# Patient Record
Sex: FEMALE | Race: WHITE | Employment: PART TIME | URBAN - METROPOLITAN AREA
[De-identification: names, ages, dates, MRNs, and addresses within clinical notes are randomized per-mention and may not be internally consistent; named-entity substitution may affect disease eponyms.]

---

## 2017-01-27 ENCOUNTER — OFFICE VISIT (OUTPATIENT)
Dept: INTERNAL MEDICINE CLINIC | Age: 57
End: 2017-01-27

## 2017-01-27 VITALS
DIASTOLIC BLOOD PRESSURE: 78 MMHG | OXYGEN SATURATION: 90 % | HEART RATE: 72 BPM | BODY MASS INDEX: 34.91 KG/M2 | WEIGHT: 197 LBS | HEIGHT: 63 IN | RESPIRATION RATE: 16 BRPM | SYSTOLIC BLOOD PRESSURE: 122 MMHG | TEMPERATURE: 97.8 F

## 2017-01-27 DIAGNOSIS — R73.03 PRE-DIABETES: ICD-10-CM

## 2017-01-27 DIAGNOSIS — E78.2 MIXED HYPERLIPIDEMIA: ICD-10-CM

## 2017-01-27 DIAGNOSIS — E55.9 VITAMIN D DEFICIENCY: ICD-10-CM

## 2017-01-27 DIAGNOSIS — N95.9 POSTMENOPAUSAL SYMPTOMS: ICD-10-CM

## 2017-01-27 DIAGNOSIS — R10.12 LEFT UPPER QUADRANT PAIN: Primary | ICD-10-CM

## 2017-01-27 RX ORDER — OMEPRAZOLE 10 MG/1
10 CAPSULE, DELAYED RELEASE ORAL DAILY
COMMUNITY
End: 2019-08-28 | Stop reason: ALTCHOICE

## 2017-01-27 RX ORDER — DIAZEPAM 10 MG/1
TABLET ORAL
Refills: 1 | COMMUNITY
Start: 2017-01-02 | End: 2017-03-15 | Stop reason: ALTCHOICE

## 2017-01-27 RX ORDER — VENLAFAXINE 37.5 MG/1
37.5 TABLET ORAL 2 TIMES DAILY
Qty: 60 TAB | Refills: 0 | Status: SHIPPED | OUTPATIENT
Start: 2017-01-27 | End: 2017-02-22 | Stop reason: SDUPTHER

## 2017-01-27 NOTE — MR AVS SNAPSHOT
Visit Information Date & Time Provider Department Dept. Phone Encounter #  
 1/27/2017  8:45 AM Elizabeth Lucas MD [facility] Internal Medicine [phone] [id number] Upcoming Health Maintenance Date Due Hepatitis C Screening 1960 DTaP/Tdap/Td series (1 - Tdap) 12/16/1982 PAP AKA CERVICAL CYTOLOGY 1/28/2016 INFLUENZA AGE 9 TO ADULT 8/1/2016 BREAST CANCER SCRN MAMMOGRAM 4/4/2018 COLONOSCOPY 7/28/2022 Allergies as of 1/27/2017  Review Complete On: 1/27/2017 By: Elizabeth Lucas MD  
  
 Severity Noted Reaction Type Reactions Tylenol-codeine #3 [Acetaminophen-codeine] High 07/05/2013   Side Effect Other (comments) Vomitting, and upset stomach state that she is not allergic Iodinated Contrast Media - Oral And Iv Dye  09/04/2015   Systemic Hives Current Immunizations  Never Reviewed Name Date Influenza Vaccine Intradermal PF 11/13/2015 Not reviewed this visit You Were Diagnosed With   
  
 Codes Comments Left upper quadrant pain    -  Primary ICD-10-CM: R10.12 ICD-9-CM: 789.02 Postmenopausal symptoms     ICD-10-CM: N95.9 ICD-9-CM: 627.9 Pre-diabetes     ICD-10-CM: R73.03 
ICD-9-CM: 790.29 Mixed hyperlipidemia     ICD-10-CM: E78.2 ICD-9-CM: 272.2 Vitamin D deficiency     ICD-10-CM: E55.9 ICD-9-CM: 268.9 Vitals BP Pulse Temp Resp Height(growth percentile) Weight(growth percentile) 122/78 (BP 1 Location: Right arm, BP Patient Position: Sitting) 72 97.8 °F (36.6 °C) (Oral) 16 5' 3\" (1.6 m) 197 lb (89.4 kg) SpO2 BMI OB Status Smoking Status 90% 34.9 kg/m2 Ablation Never Smoker Vitals History BMI and BSA Data Body Mass Index Body Surface Area 34.9 kg/m 2 1.99 m 2 Preferred Pharmacy Pharmacy Name Phone [id number] [phone] Your Updated Medication List  
  
   
 This list is accurate as of: 1/27/17 10:16 AM.  Always use your most recent med list.  
  
  
  
  
 diazePAM 10 mg tablet Commonly known as:  VALIUM  
TAKE 1 TABLET BY MOUTH AT BEDTIME  
  
 omeprazole 10 mg capsule Commonly known as:  PRILOSEC Take 10 mg by mouth daily. PERCOCET 5-325 mg per tablet Generic drug:  oxyCODONE-acetaminophen Take 1 Tab by mouth every four (4) hours as needed for Pain. venlafaxine 37.5 mg tablet Commonly known as:  [facility] Take 1 Tab by mouth two (2) times a day for 30 days. VITAMIN D3 1,000 unit Cap Generic drug:  cholecalciferol Take  by mouth. Prescriptions Sent to Pharmacy Refills  
 venlafaxine (EFFEXOR) 37.5 mg tablet 0 Sig: Take 1 Tab by mouth two (2) times a day for 30 days. Class: Normal  
 Pharmacy: [facility] #: [phone] Route: Oral  
  
We Performed the Following HEMOGLOBIN A1C WITH EAG [87388 CPT(R)] LIPID PANEL [05324 CPT(R)] METABOLIC PANEL, COMPREHENSIVE [87358 CPT(R)] REFERRAL TO GASTROENTEROLOGY [XUL54 Custom] Comments:  
 Needs endoscopy , gastric tenderness VITAMIN D, 25 HYDROXY [id number] CPT(R)] To-Do List   
 01/27/2017 Lab:  VITAMIN D, 25 HYDROXY Referral Information Referral ID Referred By Referred To  
  
 [id number] Polina Sake Not Available Visits Status Start Date End Date 1 New Request 1/27/17 1/27/18 If your referral has a status of pending review or denied, additional information will be sent to support the outcome of this decision. Introducing [facility]! Javier Sher introduces [employer] patient portal. Now you can access parts of your medical record, email your doctor's office, and request medication refills online. 1. In your internet browser, go to [email or web address] 2. Click on the First Time User? Click Here link in the Sign In box. You will see the New Member Sign Up page. 3. Enter your Of[employer]es Access Code exactly as it appears below. You will not need to use this code after youve completed the sign-up process. If you do not sign up before the expiration date, you must request a new code. · Of[employer]es Access Code: [email or web address] Expires: 4/27/2017  9:41 AM 
 
4. Enter the last four digits of your Social Security Number (xxxx) and Date of Birth (mm/dd/yyyy) as indicated and click Submit. You will be taken to the next sign-up page. 5. Create a Offees ID. This will be your Offees login ID and cannot be changed, so think of one that is secure and easy to remember. 6. Create a Of[employer]es password. You can change your password at any time. 7. Enter your Password Reset Question and Answer. This can be used at a later time if you forget your password. 8. Enter your e-mail address. You will receive e-mail notification when new information is available in [address] 9. Click Sign Up. You can now view and download portions of your medical record. 10. Click the Download Summary menu link to download a portable copy of your medical information. If you have questions, please visit the Frequently Asked Questions section of the Of[employer]es website. Remember, Of[employer]es is NOT to be used for urgent needs. For medical emergencies, dial 911. Now available from your iPhone and Android! Please provide this summary of care documentation to your next provider. Your primary care clinician is listed as Marv Hyatt. If you have any questions after today's visit, please call [phone].

## 2017-01-27 NOTE — PROGRESS NOTES
Written by Ramandeep Mejia, as dictated by Dr. Nakita Asher MD.    Rebel Ho is a 55 y.o. female. HPI  The patient comes in for an ED follow up. About 2 weeks ago she had a dull ache in her LUQ. She started getting so nauseated and she could not eat any food. She had no diarrhea or vomiting. She had a fever of 101.4 and chills. She had a CT scan done at the ED that showed colitis. She did not have a flu test. She is no longer having fever as of yesterday. She has been using omeprazole for 6 days, which has helped pain  some. She did take some tylenol for her fever and 1 advil. Advil typically upsets her stomach. She is only taking a half percocet once a day now. She takes Valium as needed for muscle spasms. Now that she is only going to work once a week, her back pain has improved. She is having really bad hot flashes with her menopause. She has tried gels and patches. She did try hormonal pills, which helped, but she does not want to take them. She is having mood swings as well. She was on Effexor in her 25s for anxiety but does not recall any side effects from the medication. She has not been taking her vitamin D supplements. She has gained 4 lbs in the last year. Her A1c and cholesterol were both borderline last year. Her colonoscopy in 02/23. She did not get a flu shot this year. Patient Active Problem List   Diagnosis Code    Hyperglycemia R73.9    Herniated disc [id number]        Current Outpatient Prescriptions on File Prior to Visit   Medication Sig Dispense Refill    oxyCODONE-acetaminophen (PERCOCET) 5-325 mg per tablet Take 1 Tab by mouth every four (4) hours as needed for Pain.  Cholecalciferol, Vitamin D3, (VITAMIN D3) 1,000 unit cap Take  by mouth. No current facility-administered medications on file prior to visit.         Allergies   Allergen Reactions    Tylenol-Codeine #3 [Acetaminophen-Codeine] Other (comments) Vomitting, and upset stomach state that she is not allergic    Iodinated Contrast Media - Oral And Iv Dye Hives       Past Medical History   Diagnosis Date    Asthma     GERD (gastroesophageal reflux disease)     Nutcracker esophagus 2011       Past Surgical History   Procedure Laterality Date    Pr abdomen surgery proc unlisted      Hx heent      Endoscopy, colon, diagnostic      Hx gyn      Hx lumbar diskectomy  9/5/2013       Family History   Problem Relation Age of Onset    Cancer Mother     Heart Disease Father        Social History     Social History    Marital status:      Spouse name: N/A    Number of children: N/A    Years of education: N/A     Occupational History    Not on file. Social History Main Topics    Smoking status: Never Smoker    Smokeless tobacco: Never Used    Alcohol use Yes    Drug use: No    Sexual activity: Yes     Partners: Male     Other Topics Concern    Not on file     Social History Narrative           Review of Systems   Constitutional: Negative for malaise/fatigue. HENT: Negative for congestion. Respiratory: Negative for cough and wheezing. Cardiovascular: Negative for chest pain and palpitations. Gastrointestinal: Positive for abdominal pain. Negative for heartburn. Musculoskeletal: Negative for joint pain and myalgias. Neurological: Negative for weakness and headaches. Visit Vitals    /78 (BP 1 Location: Right arm, BP Patient Position: Sitting)    Pulse 72    Temp 97.8 °F (36.6 °C) (Oral)    Resp 16    Ht 5' 3\" (1.6 m)    Wt 197 lb (89.4 kg)    SpO2 90%    BMI 34.9 kg/m2     Physical Exam   Constitutional: She is oriented to person, place, and time. She appears well-nourished. No distress. HENT:   Right Ear: External ear normal.   Left Ear: External ear normal.   Mouth/Throat: Oropharynx is clear and moist.   Eyes: Conjunctivae and EOM are normal. Right eye exhibits no discharge. Left eye exhibits no discharge. Neck: Normal range of motion. Neck supple. Cardiovascular: Normal rate and regular rhythm. Pulmonary/Chest: Effort normal and breath sounds normal. She has no wheezes. Abdominal: Soft. Bowel sounds are normal. She exhibits no distension. There is tenderness. Gastric tenderness on palpation   Lymphadenopathy:     She has no cervical adenopathy. Neurological: She is alert and oriented to person, place, and time. Skin: Skin is intact. Psychiatric: She has a normal mood and affect. Nursing note and vitals reviewed. ASSESSMENT and PLAN    ICD-10-CM ICD-9-CM    1. Left upper quadrant pain R10.12 789.02 REFERRAL TO GASTROENTEROLOGY    I want her to schedule an endoscopy for the same time she is getting a colonoscopy in February. I discussed she needs to continue to take Prilosec. 2. Postmenopausal symptoms N95.9 627.9 venlafaxine (EFFEXOR) 37.5 mg tablet sent to pharmacy. I want her to take Effexor since she has previously taken it before and the cost of Selene Orts is too high. 3. Pre-diabetes R73.03 790.29 HEMOGLOBIN A1C WITH EAG      METABOLIC PANEL, COMPREHENSIVE    Her levels were borderline, so we will recheck them today. 4. Mixed hyperlipidemia E78.2 272.2 LIPID PANEL   5. Vitamin D deficiency E55.9 268.9 VITAMIN D, 25 HYDROXY    She has not been taking her vitamin D and I told her she needs to take them. This plan was reviewed with the patient and patient agrees. All questions were answered. This scribe documentation was reviewed by me and accurately reflects the examination and decisions made by me. This note will not be viewable in [address]

## 2017-01-28 LAB
25(OH)D3+25(OH)D2 SERPL-MCNC: 13.1 NG/ML (ref 30–100)
ALBUMIN SERPL-MCNC: 4.5 G/DL (ref 3.5–5.5)
ALBUMIN/GLOB SERPL: 1.5 {RATIO} (ref 1.1–2.5)
ALP SERPL-CCNC: 62 IU/L (ref 39–117)
ALT SERPL-CCNC: 36 IU/L (ref 0–32)
AST SERPL-CCNC: 20 IU/L (ref 0–40)
BILIRUB SERPL-MCNC: <0.2 MG/DL (ref 0–1.2)
BUN SERPL-MCNC: 19 MG/DL (ref 6–24)
BUN/CREAT SERPL: 25 (ref 9–23)
CALCIUM SERPL-MCNC: 9.8 MG/DL (ref 8.7–10.2)
CHLORIDE SERPL-SCNC: 99 MMOL/L (ref 96–106)
CHOLEST SERPL-MCNC: 208 MG/DL (ref 100–199)
CO2 SERPL-SCNC: 22 MMOL/L (ref 18–29)
CREAT SERPL-MCNC: 0.77 MG/DL (ref 0.57–1)
EST. AVERAGE GLUCOSE BLD GHB EST-MCNC: 126 MG/DL
GLOBULIN SER CALC-MCNC: 3.1 G/DL (ref 1.5–4.5)
GLUCOSE SERPL-MCNC: 91 MG/DL (ref 65–99)
HBA1C MFR BLD: 6 % (ref 4.8–5.6)
HDLC SERPL-MCNC: 57 MG/DL
INTERPRETATION, 910389: NORMAL
LDLC SERPL CALC-MCNC: 124 MG/DL (ref 0–99)
POTASSIUM SERPL-SCNC: 4.5 MMOL/L (ref 3.5–5.2)
PROT SERPL-MCNC: 7.6 G/DL (ref 6–8.5)
SODIUM SERPL-SCNC: 138 MMOL/L (ref 134–144)
TRIGL SERPL-MCNC: 135 MG/DL (ref 0–149)
VLDLC SERPL CALC-MCNC: 27 MG/DL (ref 5–40)

## 2017-02-22 DIAGNOSIS — N95.9 POSTMENOPAUSAL SYMPTOMS: ICD-10-CM

## 2017-02-22 RX ORDER — VENLAFAXINE 37.5 MG/1
37.5 TABLET ORAL 2 TIMES DAILY
Qty: 60 TAB | Refills: 2 | Status: SHIPPED | OUTPATIENT
Start: 2017-02-22 | End: 2017-02-27 | Stop reason: SDUPTHER

## 2017-02-27 DIAGNOSIS — N95.9 POSTMENOPAUSAL SYMPTOMS: ICD-10-CM

## 2017-02-28 RX ORDER — VENLAFAXINE 37.5 MG/1
37.5 TABLET ORAL 2 TIMES DAILY
Qty: 60 TAB | Refills: 2 | Status: SHIPPED | OUTPATIENT
Start: 2017-02-28 | End: 2017-03-01 | Stop reason: SDUPTHER

## 2017-03-01 DIAGNOSIS — N95.9 POSTMENOPAUSAL SYMPTOMS: ICD-10-CM

## 2017-03-01 RX ORDER — VENLAFAXINE 37.5 MG/1
37.5 TABLET ORAL 2 TIMES DAILY
Qty: 60 TAB | Refills: 2 | Status: SHIPPED | OUTPATIENT
Start: 2017-03-01 | End: 2017-10-09 | Stop reason: ALTCHOICE

## 2017-03-15 ENCOUNTER — OFFICE VISIT (OUTPATIENT)
Dept: INTERNAL MEDICINE CLINIC | Age: 57
End: 2017-03-15

## 2017-03-15 VITALS
RESPIRATION RATE: 16 BRPM | WEIGHT: 189.4 LBS | BODY MASS INDEX: 33.56 KG/M2 | TEMPERATURE: 98.8 F | HEART RATE: 82 BPM | DIASTOLIC BLOOD PRESSURE: 86 MMHG | OXYGEN SATURATION: 98 % | HEIGHT: 63 IN | SYSTOLIC BLOOD PRESSURE: 118 MMHG

## 2017-03-15 DIAGNOSIS — R23.2 HOT FLASHES: Primary | ICD-10-CM

## 2017-03-15 DIAGNOSIS — R07.89 CHEST TIGHTNESS: ICD-10-CM

## 2017-03-15 DIAGNOSIS — R53.83 FATIGUE, UNSPECIFIED TYPE: ICD-10-CM

## 2017-03-15 DIAGNOSIS — J45.20 ALLERGY-INDUCED ASTHMA, MILD INTERMITTENT, UNCOMPLICATED: ICD-10-CM

## 2017-03-15 RX ORDER — ALBUTEROL SULFATE 90 UG/1
1 AEROSOL, METERED RESPIRATORY (INHALATION)
Qty: 1 INHALER | Refills: 0 | Status: SHIPPED | OUTPATIENT
Start: 2017-03-15 | End: 2017-05-16 | Stop reason: SDUPTHER

## 2017-03-15 NOTE — PROGRESS NOTES
Chief Complaint   Patient presents with    Follow-up     for er visit and vitamin d.  states that there was symptoms like a heart attack, nausea, pain went to [place] [facility] on [facility] and had scan with dye, no cardio problems found. states that since starting effexor for hot flashes she is having confusion, extreme fatigue and nausea.

## 2017-03-15 NOTE — MR AVS SNAPSHOT
Visit Information Date & Time Provider Department Dept. Phone Encounter #  
 3/15/2017 11:45 AM Becky Llamas MD [facility] Internal Medicine [phone] [id number] Upcoming Health Maintenance Date Due Hepatitis C Screening 1960 DTaP/Tdap/Td series (1 - Tdap) 12/16/1982 PAP AKA CERVICAL CYTOLOGY 1/28/2016 BREAST CANCER SCRN MAMMOGRAM 4/4/2018 COLONOSCOPY 7/28/2022 Allergies as of 3/15/2017  Review Complete On: 3/15/2017 By: Becky Llamas MD  
  
 Severity Noted Reaction Type Reactions Tylenol-codeine #3 [Acetaminophen-codeine] High 07/05/2013   Side Effect Other (comments) Vomitting, and upset stomach state that she is not allergic Iodinated Contrast Media - Oral And Iv Dye  09/04/2015   Systemic Hives Current Immunizations  Never Reviewed Name Date Influenza Vaccine Intradermal PF 11/13/2015 Not reviewed this visit You Were Diagnosed With   
  
 Codes Comments Hot flashes    -  Primary ICD-10-CM: R23.2 ICD-9-CM: 782.62 Chest tightness     ICD-10-CM: R07.89 ICD-9-CM: 786.59 Fatigue, unspecified type     ICD-10-CM: R53.83 ICD-9-CM: 780.79 Allergy-induced asthma, mild intermittent, uncomplicated     YZC-65-WY: J45.20 ICD-9-CM: 493.00 Vitals BP Pulse Temp Resp Height(growth percentile) Weight(growth percentile) 118/86 (BP 1 Location: Left arm, BP Patient Position: Sitting) 82 98.8 °F (37.1 °C) (Oral) 16 5' 3\" (1.6 m) 189 lb 6.4 oz (85.9 kg) SpO2 BMI OB Status Smoking Status 98% 33.55 kg/m2 Ablation Never Smoker BMI and BSA Data Body Mass Index Body Surface Area  
 33.55 kg/m 2 1.95 m 2 Preferred Pharmacy Pharmacy Name Phone [id number] [place], [address] [phone] Your Updated Medication List  
  
   
This list is accurate as of: 3/15/17 12:35 PM.  Always use your most recent med list.  
  
  
  
  
 albuterol 90 mcg/actuation inhaler Commonly known as:  PROVENTIL HFA, VENTOLIN HFA, PROAIR HFA Take 1 Puff by inhalation every six (6) hours as needed for Wheezing for up to 30 days. omeprazole 10 mg capsule Commonly known as:  PRILOSEC Take 10 mg by mouth daily. venlafaxine 37.5 mg tablet Commonly known as:  [facility] Take 1 Tab by mouth two (2) times a day. VITAMIN D3 1,000 unit Cap Generic drug:  cholecalciferol Take  by mouth. Prescriptions Sent to Pharmacy Refills  
 albuterol (PROVENTIL HFA, VENTOLIN HFA, PROAIR HFA) 90 mcg/actuation inhaler 0 Sig: Take 1 Puff by inhalation every six (6) hours as needed for Wheezing for up to 30 days. Class: Normal  
 Pharmacy: [facility] #: [id number] Route: Inhalation We Performed the Following REFERRAL TO CARDIOLOGY [TWO84 Custom] Referral Information Referral ID Referred By Referred To  
  
 [id number] Figueroa RIOS [address] [place] [address] Phone: [phone] Fax: [phone] Visits Status Start Date End Date 1 New Request 3/15/17 3/15/18 If your referral has a status of pending review or denied, additional information will be sent to support the outcome of this decision. Introducing [facility]! Javier Sher introduces [employer] patient portal. Now you can access parts of your medical record, email your doctor's office, and request medication refills online. 1. In your internet browser, go to [email or web address] 2. Click on the First Time User? Click Here link in the Sign In box. You will see the New Member Sign Up page. 3. Enter your [employer] Access Code exactly as it appears below. You will not need to use this code after youve completed the sign-up process.  If you do not sign up before the expiration date, you must request a new code. · V[employer] Access Code: [email or web address] Expires: 4/27/2017 10:41 AM 
 
4. Enter the last four digits of your Social Security Number (xxxx) and Date of Birth (mm/dd/yyyy) as indicated and click Submit. You will be taken to the next sign-up page. 5. Create a Vizibility ID. This will be your V[employer]ibility login ID and cannot be changed, so think of one that is secure and easy to remember. 6. Create a V[employer]ibility password. You can change your password at any time. 7. Enter your Password Reset Question and Answer. This can be used at a later time if you forget your password. 8. Enter your e-mail address. You will receive e-mail notification when new information is available in [address] 9. Click Sign Up. You can now view and download portions of your medical record. 10. Click the Download Summary menu link to download a portable copy of your medical information. If you have questions, please visit the Frequently Asked Questions section of the [employer] website. Remember, [employer] is NOT to be used for urgent needs. For medical emergencies, dial 911. Now available from your iPhone and Android! Please provide this summary of care documentation to your next provider. Your primary care clinician is listed as Creig Quale. If you have any questions after today's visit, please call [phone].

## 2017-03-15 NOTE — PROGRESS NOTES
Written by Fernanda Colbert, as dictated by Dr. Jose Barlow MD.    Olga Mccann is a 55 y.o. female. HPI  The patient comes in today for a follow up. She has been very tired and confused lately. Her hot flashes are now less intense since she has started the Effexor. She woke up at 3 am on Tuesday morning and she had chest pain, dizziness, and nausea and she went to the ED. She had a CT scan for a pulmonary embolism, EKG, and blood work, results were unremarkable. She did not have a stress test. She has been having a lot of anxiety and she does not want to leave the house because of the chest tightness. She is still feeling chest tightness. She did not take Effexor yesterday and her hot flashes did come back. Effexor does make her nauseated. She takes Prilosec as needed. She is going for a colonoscopy tomorrow. She does have seasonal allergies an dshe has not been taking zyrtec or allegra. Patient Active Problem List   Diagnosis Code    Hyperglycemia R73.9    Herniated disc [id number]        Current Outpatient Prescriptions on File Prior to Visit   Medication Sig Dispense Refill    venlafaxine (EFFEXOR) 37.5 mg tablet Take 1 Tab by mouth two (2) times a day. 60 Tab 2    Cholecalciferol, Vitamin D3, (VITAMIN D3) 1,000 unit cap Take  by mouth.  diazePAM (VALIUM) 10 mg tablet TAKE 1 TABLET BY MOUTH AT BEDTIME  1    omeprazole (PRILOSEC) 10 mg capsule Take 10 mg by mouth daily. No current facility-administered medications on file prior to visit.         Allergies   Allergen Reactions    Tylenol-Codeine #3 [Acetaminophen-Codeine] Other (comments)     Vomitting, and upset stomach state that she is not allergic    Iodinated Contrast Media - Oral And Iv Dye Hives       Past Medical History:   Diagnosis Date    Asthma     GERD (gastroesophageal reflux disease)     Nutcracker esophagus 2011       Past Surgical History:   Procedure Laterality Date    ABDOMEN SURGERY PROC UNLISTED      ENDOSCOPY, COLON, DIAGNOSTIC      HX GYN      HX HEENT      HX LUMBAR DISKECTOMY  9/5/2013       Family History   Problem Relation Age of Onset    Cancer Mother     Heart Disease Father        Social History     Social History    Marital status:      Spouse name: N/A    Number of children: N/A    Years of education: N/A     Occupational History    Not on file. Social History Main Topics    Smoking status: Never Smoker    Smokeless tobacco: Never Used    Alcohol use Yes    Drug use: No    Sexual activity: Yes     Partners: Male     Other Topics Concern    Not on file     Social History Narrative       Office Visit on 01/27/2017   Component Date Value Ref Range Status    VITAMIN D, 25-HYDROXY 01/27/2017 13.1* 30.0 - 100.0 ng/mL Final    Comment: Vitamin D deficiency has been defined by the [address] practice guideline as a  level of serum 25-OH vitamin D less than 20 ng/mL (1,2). The Endocrine Society went on to further define vitamin D  insufficiency as a level between 21 and 29 ng/mL (2). 1. IOM ([place] [employer]). 2010. Dietary reference     intakes for calcium and D. [facility]: The     [employer]. 2. Mandi MF, Marilee ORR, Suzan LEA, et al.     Evaluation, treatment, and prevention of vitamin D     deficiency: an Endocrine Society clinical practice     guideline. JCEM. 2011 Jul; 96(7):1911-30.       Glucose 01/27/2017 91  65 - 99 mg/dL Final    BUN 01/27/2017 19  6 - 24 mg/dL Final    Creatinine 01/27/2017 0.77  0.57 - 1.00 mg/dL Final    GFR est non-AA 01/27/2017 87  >59 mL/min/1.73 Final    GFR est AA 01/27/2017 101  >59 mL/min/1.73 Final    BUN/Creatinine ratio 01/27/2017 25* 9 - 23 Final    Sodium 01/27/2017 138  134 - 144 mmol/L Final    Potassium 01/27/2017 4.5  3.5 - 5.2 mmol/L Final    Chloride 01/27/2017 99  96 - 106 mmol/L Final    CO2 01/27/2017 22  18 - 29 mmol/L Final    Calcium 01/27/2017 9.8  8.7 - 10.2 mg/dL Final    Protein, total 01/27/2017 7.6  6.0 - 8.5 g/dL Final    Albumin 01/27/2017 4.5  3.5 - 5.5 g/dL Final    GLOBULIN, TOTAL 01/27/2017 3.1  1.5 - 4.5 g/dL Final    A-G Ratio 01/27/2017 1.5  1.1 - 2.5 Final    Bilirubin, total 01/27/2017 <0.2  0.0 - 1.2 mg/dL Final    Alk. phosphatase 01/27/2017 62  39 - 117 IU/L Final    AST (SGOT) 01/27/2017 20  0 - 40 IU/L Final    ALT (SGPT) 01/27/2017 36* 0 - 32 IU/L Final    Cholesterol, total 01/27/2017 208* 100 - 199 mg/dL Final    Triglyceride 01/27/2017 135  0 - 149 mg/dL Final    HDL Cholesterol 01/27/2017 57  >39 mg/dL Final    VLDL, calculated 01/27/2017 27  5 - 40 mg/dL Final    LDL, calculated 01/27/2017 124* 0 - 99 mg/dL Final    Hemoglobin A1c 01/27/2017 6.0* 4.8 - 5.6 % Final    Comment:          Pre-diabetes: 5.7 - 6.4           Diabetes: >6.4           Glycemic control for adults with diabetes: <7.0      Estimated average glucose 01/27/2017 126  mg/dL Final    INTERPRETATION 01/27/2017 Note   Final    Supplement report is available. Review of Systems   Constitutional: Positive for malaise/fatigue. Respiratory: Negative for cough and wheezing. Cardiovascular: Negative for chest pain and palpitations. Gastrointestinal: Positive for heartburn and nausea. Musculoskeletal: Negative for joint pain and myalgias. Neurological: Negative for weakness and headaches. Visit Vitals    /86 (BP 1 Location: Left arm, BP Patient Position: Sitting)    Pulse 82    Temp 98.8 °F (37.1 °C) (Oral)    Resp 16    Ht 5' 3\" (1.6 m)    Wt 189 lb 6.4 oz (85.9 kg)    SpO2 98%    BMI 33.55 kg/m2       Physical Exam   Constitutional: She is oriented to person, place, and time. She appears well-nourished. No distress.    HENT:   Right Ear: External ear normal.   Left Ear: External ear normal.   Mouth/Throat: Oropharynx is clear and moist.   Eyes: Conjunctivae and EOM are normal. Right eye exhibits no discharge. Left eye exhibits no discharge. Neck: Normal range of motion. Neck supple. Cardiovascular: Normal rate and regular rhythm. Pulmonary/Chest: Effort normal and breath sounds normal. She has no wheezes. Abdominal: Soft. Bowel sounds are normal. She exhibits no distension. Lymphadenopathy:     She has no cervical adenopathy. Neurological: She is alert and oriented to person, place, and time. Skin: Skin is intact. Psychiatric: She has a normal mood and affect. Nursing note and vitals reviewed. ASSESSMENT and PLAN    ICD-10-CM ICD-9-CM    1. Hot flashes R23.2 782.62 I discussed that if she thinks that these sxs started after she started the Effexor then I want her to stop it for 1 week. 2. Chest tightness R07.89 786.59 REFERRAL TO CARDIOLOGY    I want her to start taking Prilosec everyday for 2 weeks to see if this helps. 3. Fatigue, unspecified type R53.83 780.79 REFERRAL TO CARDIOLOGY    I want her to go for a stress test to make sure there is no underlying blockage. I want her to follow with Dr. Mikel Felton her colonoscopy. 4. Allergy-induced asthma, mild intermittent, uncomplicated F45.66 743.64 albuterol (PROVENTIL HFA, VENTOLIN HFA, PROAIR HFA) 90 mcg/actuation inhaler sent to pharmacy. I want her to start taking zyrtec daily and I will refill her albuterol inhaler and I want her to use it. This plan was reviewed with the patient and patient agrees. All questions were answered. This scribe documentation was reviewed by me and accurately reflects the examination and decisions made by me. This note will not be viewable in [address]

## 2017-04-26 ENCOUNTER — HOSPITAL ENCOUNTER (OUTPATIENT)
Dept: MAMMOGRAPHY | Age: 57
Discharge: HOME | End: 2017-04-26
Attending: OBSTETRICS & GYNECOLOGY
Payer: COMMERCIAL

## 2017-04-26 DIAGNOSIS — Z12.31 VISIT FOR SCREENING MAMMOGRAM: ICD-10-CM

## 2017-04-26 PROCEDURE — 77063 BREAST TOMOSYNTHESIS BI: CPT

## 2017-05-16 DIAGNOSIS — R07.89 CHEST TIGHTNESS: ICD-10-CM

## 2017-05-16 DIAGNOSIS — J45.20 ALLERGY-INDUCED ASTHMA, MILD INTERMITTENT, UNCOMPLICATED: ICD-10-CM

## 2017-05-16 RX ORDER — ALBUTEROL SULFATE 90 UG/1
AEROSOL, METERED RESPIRATORY (INHALATION)
Qty: 8.5 INHALER | Refills: 0 | Status: SHIPPED | OUTPATIENT
Start: 2017-05-16 | End: 2018-01-31 | Stop reason: SDUPTHER

## 2017-06-12 ENCOUNTER — OFFICE VISIT (OUTPATIENT)
Dept: CARDIOLOGY CLINIC | Age: 57
End: 2017-06-12

## 2017-06-12 VITALS
SYSTOLIC BLOOD PRESSURE: 122 MMHG | WEIGHT: 197.6 LBS | BODY MASS INDEX: 35.01 KG/M2 | RESPIRATION RATE: 16 BRPM | HEART RATE: 86 BPM | DIASTOLIC BLOOD PRESSURE: 86 MMHG | HEIGHT: 63 IN

## 2017-06-12 DIAGNOSIS — R53.83 FATIGUE, UNSPECIFIED TYPE: ICD-10-CM

## 2017-06-12 DIAGNOSIS — R00.2 PALPITATIONS: ICD-10-CM

## 2017-06-12 DIAGNOSIS — R07.9 CHEST PAIN, UNSPECIFIED TYPE: Primary | ICD-10-CM

## 2017-06-12 DIAGNOSIS — E78.5 DYSLIPIDEMIA: ICD-10-CM

## 2017-06-12 DIAGNOSIS — R73.02 IMPAIRED GLUCOSE TOLERANCE: ICD-10-CM

## 2017-06-12 DIAGNOSIS — R94.31 ABNORMAL ECG: ICD-10-CM

## 2017-06-12 RX ORDER — ESTRADIOL 1 MG/1
1 TABLET ORAL DAILY
COMMUNITY
Start: 2017-06-10

## 2017-06-12 RX ORDER — NITROGLYCERIN 0.4 MG/1
0.4 TABLET SUBLINGUAL
Qty: 1 BOTTLE | Refills: 1 | Status: SHIPPED | OUTPATIENT
Start: 2017-06-12 | End: 2019-08-28 | Stop reason: ALTCHOICE

## 2017-06-12 NOTE — MR AVS SNAPSHOT
Visit Information Date & Time Provider Department Dept. Phone Encounter #  
 6/12/2017  1:40 PM Queta Stewart MD [facility] Reyna [phone] [id number] Upcoming Health Maintenance Date Due Hepatitis C Screening 1960 Pneumococcal 19-64 Medium Risk (1 of 1 - PPSV23) 12/16/1980 DTaP/Tdap/Td series (1 - Tdap) 12/16/1982 PAP AKA CERVICAL CYTOLOGY 1/28/2016 INFLUENZA AGE 9 TO ADULT 8/1/2017 BREAST CANCER SCRN MAMMOGRAM 4/26/2019 COLONOSCOPY 7/28/2022 Allergies as of 6/12/2017  Review Complete On: 6/12/2017 By: Denita Najera Severity Noted Reaction Type Reactions Tylenol-codeine #3 [Acetaminophen-codeine] High 07/05/2013   Side Effect Other (comments) Vomitting, and upset stomach state that she is not allergic Iodinated Contrast Media - Oral And Iv Dye  09/04/2015   Systemic Hives Current Immunizations  Never Reviewed Name Date Influenza Vaccine Intradermal PF 11/13/2015 Not reviewed this visit You Were Diagnosed With   
  
 Codes Comments Chest pain, unspecified type    -  Primary ICD-10-CM: R07.9 ICD-9-CM: 786.50 Fatigue, unspecified type     ICD-10-CM: R53.83 ICD-9-CM: 780.79 Impaired glucose tolerance     ICD-10-CM: R73.02 
ICD-9-CM: 790.22 Dyslipidemia     ICD-10-CM: E78.5 ICD-9-CM: 272.4 Palpitations     ICD-10-CM: R00.2 ICD-9-CM: 785.1 Abnormal ECG     ICD-10-CM: R94.31 
ICD-9-CM: 794.31 Vitals BP Pulse Resp Height(growth percentile) Weight(growth percentile) BMI  
 122/86 (BP 1 Location: Left arm, BP Patient Position: Sitting) 86 16 5' 3\" (1.6 m) 197 lb 9.6 oz (89.6 kg) 35 kg/m2 OB Status Smoking Status Ablation Never Smoker Vitals History BMI and BSA Data Body Mass Index Body Surface Area 35 kg/m 2 2 m 2 Preferred Pharmacy Pharmacy Name Phone [id number]- [facility] Hand, [address] [phone] Your Updated Medication List  
  
   
This list is accurate as of: 17  3:10 PM.  Always use your most recent med list.  
  
  
  
  
 estradiol 1 mg tablet Commonly known as:  ESTRACE Take 1 Tab by mouth daily. nitroglycerin 0.4 mg SL tablet Commonly known as:  NITROSTAT  
1 Tab by SubLINGual route every five (5) minutes as needed for Chest Pain for up to 3 doses. omeprazole 10 mg capsule Commonly known as:  PRILOSEC Take 10 mg by mouth daily. PROAIR HFA 90 mcg/actuation inhaler Generic drug:  albuterol INHALE 1 PUFF BY INHALATION EVERY 6 HOURS AS NEEDED FOR WHEEZING  
  
 venlafaxine 37.5 mg tablet Commonly known as:  [facility] Take 1 Tab by mouth two (2) times a day. VITAMIN D3 1,000 unit Cap Generic drug:  cholecalciferol Take  by mouth. Prescriptions Sent to Pharmacy Refills  
 nitroglycerin (NITROSTAT) 0.4 mg SL tablet 1 Si Tab by SubLINGual route every five (5) minutes as needed for Chest Pain for up to 3 doses. Class: Normal  
 Pharmacy: [facility] #: [phone] Route: SubLINGual  
  
We Performed the Following AMB POC EKG ROUTINE W/  LEADS, INTER & REP [85297 CPT(R)] To-Do List   
 2017 ECHO:  ECHO TTE STRESS EXRCSE COMP W OR WO CONTR   
  
 2017 Cardiac Services:  LOOP MONITOR Patient Instructions You have been given a prescription for Nitroglycerin to take ONLY AS NEEDED for chest pain. You can take one tablet under your tongue for chest pain every 5 minutes up to a total of 3 tablets. If your chest pain is not gone after the 3rd tab, call 9-11 and go to the nearest emergency room. This medication can also help with esophageal spasm. Please make a follow up appointment with gastroenterology as well. If your stress test is normal please consider having a coronary calcium scan to see if there is any evidence of early plaque in the arteries of your heart since you have a family history of coronary artery disease If your coronary calcium scan shows early plaque then we will advise you to begin taking a daily aspirin and will prescribe cholesterol medication If your stress test is normal please start exercising 30-45 minutes/day 5 days/week We will mail the heart monitor to your home and you should wear it for one month. We will call you with the results. Introducing [facility] & HEALTH SERVICES! [facility] introduces [employer] patient portal. Now you can access parts of your medical record, email your doctor's office, and request medication refills online. 1. In your internet browser, go to [email or web address] 2. Click on the First Time User? Click Here link in the Sign In box. You will see the New Member Sign Up page. 3. Enter your [employer] Access Code exactly as it appears below. You will not need to use this code after youve completed the sign-up process. If you do not sign up before the expiration date, you must request a new code. · [employer] Access Code: [email or web address] Expires: 9/10/2017  3:02 PM 
 
4. Enter the last four digits of your Social Security Number (xxxx) and Date of Birth (mm/dd/yyyy) as indicated and click Submit. You will be taken to the next sign-up page. 5. Create a [employer] ID. This will be your [employer] login ID and cannot be changed, so think of one that is secure and easy to remember. 6. Create a [employer] password. You can change your password at any time. 7. Enter your Password Reset Question and Answer. This can be used at a later time if you forget your password. 8. Enter your e-mail address. You will receive e-mail notification when new information is available in [address] 9. Click Sign Up. You can now view and download portions of your medical record. 10. Click the Download Summary menu link to download a portable copy of your medical information. If you have questions, please visit the Frequently Asked Questions section of the [employer] website. Remember, [employer] is NOT to be used for urgent needs. For medical emergencies, dial 911. Now available from your iPhone and Android! Please provide this summary of care documentation to your next provider. Your primary care clinician is listed as Maranda Browning. If you have any questions after today's visit, please call [phone].

## 2017-06-12 NOTE — PATIENT INSTRUCTIONS
You have been given a prescription for Nitroglycerin to take ONLY AS NEEDED for chest pain. You can take one tablet under your tongue for chest pain every 5 minutes up to a total of 3 tablets. If your chest pain is not gone after the 3rd tab, call 9-11 and go to the nearest emergency room. This medication can also help with esophageal spasm. Please make a follow up appointment with gastroenterology as well. If your stress test is normal please consider having a coronary calcium scan to see if there is any evidence of early plaque in the arteries of your heart since you have a family history of coronary artery disease  If your coronary calcium scan shows early plaque then we will advise you to begin taking a daily aspirin and will prescribe cholesterol medication  If your stress test is normal please start exercising 30-45 minutes/day 5 days/week    We will mail the heart monitor to your home and you should wear it for one month. We will call you with the results.

## 2017-06-12 NOTE — PROGRESS NOTES
[facility]  [phone]    Reason for consult: chest pain  Requesting physician: Dr. Alexandr Allen    HPI: Yeny Boswell, a 55y.o. year-old who presents for evaluation of chest pain. A few months ago she woke up in the middle of the night with crushing chest pain and associated nausea and anxiety and diaphoresis  She has had esophageal spasms for years which are manifested by midsternal chest pain and bilateral shoulder pain but this felt much different  This pain was more severe and accompanied by other symptoms as listed above  She went to [facility] by ambulance and had a Chest CTA that was ok - will request records from [facility]  She also says that recently when she exercises she has some chest tightness while on the elliptical after about 20-25 minutes   She has also noticed that about 1 hour after exercising she becomes very fatigued and has to go to sleep  With her family hx of CAD she is now afraid to exercise and has not exercised in the last month  Has constant sternal pain which is not new, plans to see GI soon for follow up  Denies dyspnea with exertion  No PND, no orthopnea  Sometimes she will have heart racing, in the last month she has had it twice  Episodes can last several minutes at a time, maybe 20 minutes, doesn't respond to vagal manuevers, no associated symptoms  No syncope  Has had vertigo in the past and also has some sinus issues but only has rare dizziness  No LE edema  Cannot remember having any cardiac testing in the past  Reviewed labs from [facility] from 3/14/17 - Na 141, K 3.7, Cr 0.7, Hgb 16.0, LFTs ok  Reviewed Chest CTA results - see below, no PE  Reviewed records from [facility] from 3/17 - scanned into [facility]    Assessment/Plan:  1.   Chest pressure/fatigue - will order stress echo to assess for ischemia, discussed having a coronary calcium scan if her stress test is negative to assess for evidence of early plaque in the arteries of her heart since she has a family history of CAD  -will give NTG 0.4mg SL tabs PRN chest pain which may help with esophageal spasm as well as ischemic chest pain   2. Had hives with MRI but recently had Chest CTA at [facility] and did not have a reaction    3. Dyslipidemia - Lipids 1/17 - , , , HDL 57, working on diet and exercise now  4. IGT - Hgb A1C 6.0% in 1/17, could not tolerate Metformin 1000mg daily and currently not taking it, working on diet and exercise now  5. GERD/nutcracker esophagus - only takes pepcid PRN, may consider calcium channel blocker for symptom control in the future  -given Rx for NTG 0.4mg SL tabs PRN chest pain today  -Gi fuv  6. Allergy induced asthma - has albuterol inhaler PRN  7. 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 Palpitations - will order 1 month loop monitor to assess for arrhythmias    Chest CTA 3/17 - normal, no PE    Fam hx: father had MI/PCIs at age 79, passed from leukemia, mother had CVA at age 68   Soc hx: no tobacco use, drinks 1-2 alcoholic drinks/month, no drug use    She  has a past medical history of Asthma; GERD (gastroesophageal reflux disease); and Nutcracker esophagus (2011). Cardiovascular ROS: positive for fatigue and chest pain  Respiratory ROS: no cough, shortness of breath, or wheezing  Neurological ROS: no TIA or stroke symptoms  All other systems negative except as above. PE  Vitals:    06/12/17 1348   BP: 122/86   Pulse: 86   Resp: 16   Weight: 197 lb 9.6 oz (89.6 kg)   Height: 5' 3\" (1.6 m)    Body mass index is 35 kg/(m^2).    General appearance - alert, well appearing, and in no distress  Mental status - affect appropriate to mood  Eyes - sclera anicteric, moist mucous membranes  Neck - supple  Lymphatics - not assessed  Chest - clear to auscultation, no wheezes, rales or rhonchi  Heart - normal rate, regular rhythm, normal S1, S2, no murmurs, rubs, clicks or gallops  Abdomen - soft, nontender, nondistended, no masses or organomegaly  Back exam - full range of motion, no tenderness  Neurological - cranial nerves II through XII grossly intact, no focal deficit  Musculoskeletal - no muscular tenderness noted, normal strength  Extremities - peripheral pulses normal, no pedal edema  Skin - normal coloration  no rashes    12 lead ECG: NSR with poor R wave progression, unchanged since 2015    Recent Labs:  Lab Results   Component Value Date/Time    Cholesterol, total 208 01/27/2017 09:43 AM    HDL Cholesterol 57 01/27/2017 09:43 AM    LDL, calculated 124 01/27/2017 09:43 AM    Triglyceride 135 01/27/2017 09:43 AM     Lab Results   Component Value Date/Time    Creatinine 0.77 01/27/2017 09:43 AM     Lab Results   Component Value Date/Time    BUN 19 01/27/2017 09:43 AM     Lab Results   Component Value Date/Time    Potassium 4.5 01/27/2017 09:43 AM     Lab Results   Component Value Date/Time    Hemoglobin A1c 6.0 01/27/2017 09:43 AM     Lab Results   Component Value Date/Time    HGB 15.0 05/26/2015 09:46 AM     Lab Results   Component Value Date/Time    PLATELET 680 60/41/0782 09:46 AM       Reviewed:  Past Medical History:   Diagnosis Date    Asthma     GERD (gastroesophageal reflux disease)     Nutcracker esophagus 2011     History   Smoking Status    Never Smoker   Smokeless Tobacco    Never Used     History   Alcohol Use    Yes     Comment: occasional     Allergies   Allergen Reactions    Tylenol-Codeine #3 [Acetaminophen-Codeine] Other (comments)     Vomitting, and upset stomach state that she is not allergic    Iodinated Contrast Media - Oral And Iv Dye Hives       Current Outpatient Prescriptions   Medication Sig    estradiol (ESTRACE) 1 mg tablet Take 1 Tab by mouth daily.  nitroglycerin (NITROSTAT) 0.4 mg SL tablet 1 Tab by SubLINGual route every five (5) minutes as needed for Chest Pain for up to 3 doses.  omeprazole (PRILOSEC) 10 mg capsule Take 10 mg by mouth daily.    [address] 90 mcg/actuation inhaler INHALE 1 PUFF BY INHALATION EVERY 6 HOURS AS NEEDED FOR WHEEZING    venlafaxine (EFFEXOR) 37.5 mg tablet Take 1 Tab by mouth two (2) times a day.  Cholecalciferol, Vitamin D3, (VITAMIN D3) 1,000 unit cap Take  by mouth. No current facility-administered medications for this visit.         Cordell Amezquita MD  [facility] [address] [place] [address] [address], [address] [address]  Neftaly Hilariomocurtis  [phone]

## 2017-06-23 ENCOUNTER — CLINICAL SUPPORT (OUTPATIENT)
Dept: CARDIOLOGY CLINIC | Age: 57
End: 2017-06-23

## 2017-06-23 DIAGNOSIS — R53.83 FATIGUE, UNSPECIFIED TYPE: ICD-10-CM

## 2017-06-23 DIAGNOSIS — R94.31 ABNORMAL ECG: ICD-10-CM

## 2017-06-23 DIAGNOSIS — R73.02 IMPAIRED GLUCOSE TOLERANCE: ICD-10-CM

## 2017-06-23 DIAGNOSIS — E78.5 DYSLIPIDEMIA: ICD-10-CM

## 2017-06-23 DIAGNOSIS — R07.9 CHEST PAIN, UNSPECIFIED TYPE: ICD-10-CM

## 2017-06-28 ENCOUNTER — TELEPHONE (OUTPATIENT)
Dept: CARDIOLOGY CLINIC | Age: 57
End: 2017-06-28

## 2017-06-28 NOTE — TELEPHONE ENCOUNTER
Please call Mrs. [facility] at [phone]. She'd like the results of her echo done on 6/23/17. Dr. Praneeth Klein had mentioned possibly having her do the heart scan/calcium score testing.      Thank you, Nitin bob

## 2017-06-28 NOTE — TELEPHONE ENCOUNTER
Returned patient's call, 2 pt identifiers used  The following test results given per Dr. Liz Alvarado:    Stress Echo:  6/17, Exe:  8:00, NL Stress Echo

## 2017-06-30 ENCOUNTER — HOSPITAL ENCOUNTER (OUTPATIENT)
Dept: CT IMAGING | Age: 57
Discharge: HOME | End: 2017-06-30
Payer: SELF-PAY

## 2017-06-30 DIAGNOSIS — Z00.00 PREVENTATIVE HEALTH CARE: ICD-10-CM

## 2017-06-30 PROCEDURE — 75571 CT HRT W/O DYE W/CA TEST: CPT

## 2017-07-03 NOTE — CARDIO/PULMONARY
Reached patient at her given mobile number and shared her coronary artery CT score of zero with her. Discussed the meaning of this score. Patient has no further questions at this time.

## 2017-07-27 ENCOUNTER — DOCUMENTATION ONLY (OUTPATIENT)
Dept: CARDIOLOGY CLINIC | Age: 57
End: 2017-07-27

## 2017-09-29 ENCOUNTER — OFFICE VISIT (OUTPATIENT)
Dept: NEUROLOGY | Age: 57
End: 2017-09-29

## 2017-09-29 VITALS — OXYGEN SATURATION: 98 % | HEART RATE: 74 BPM | SYSTOLIC BLOOD PRESSURE: 125 MMHG | DIASTOLIC BLOOD PRESSURE: 80 MMHG

## 2017-09-29 DIAGNOSIS — G43.009 MIGRAINE WITHOUT AURA AND WITHOUT STATUS MIGRAINOSUS, NOT INTRACTABLE: Primary | ICD-10-CM

## 2017-09-29 DIAGNOSIS — M54.2 CERVICALGIA: ICD-10-CM

## 2017-09-29 RX ORDER — SUMATRIPTAN 100 MG/1
100 TABLET, FILM COATED ORAL
Qty: 9 TAB | Refills: 5 | Status: SHIPPED | OUTPATIENT
Start: 2017-09-29 | End: 2019-08-28 | Stop reason: ALTCHOICE

## 2017-09-29 RX ORDER — ZOLMITRIPTAN 5 MG/1
1 SPRAY NASAL
Qty: 2 CONTAINER | Refills: 0 | Status: SHIPPED | COMMUNITY
Start: 2017-09-29 | End: 2018-06-27 | Stop reason: ALTCHOICE

## 2017-09-29 NOTE — PATIENT INSTRUCTIONS
Learning About Luis Gomez  What is a living will? A living will is a legal form you use to write down the kind of care you want at the end of your life. It is used by the health professionals who will treat you if you aren't able to decide for yourself. If you put your wishes in writing, your loved ones and others will know what kind of care you want. They won't need to guess. This can ease your mind and be helpful to others. A living will is not the same as an estate or property will. An estate will explains what you want to happen with your money and property after you die. Is a living will a legal document? A living will is a legal document. Each state has its own laws about living morgan. If you move to another state, make sure that your living will is legal in the state where you now live. Or you might use a universal form that has been approved by many states. This kind of form can sometimes be completed and stored online. Your electronic copy will then be available wherever you have a connection to the Internet. In most cases, doctors will respect your wishes even if you have a form from a different state. · You don't need an  to complete a living will. But legal advice can be helpful if your state's laws are unclear, your health history is complicated, or your family can't agree on what should be in your living will. · You can change your living will at any time. Some people find that their wishes about end-of-life care change as their health changes. · In addition to making a living will, think about completing a medical power of  form. This form lets you name the person you want to make end-of-life treatment decisions for you (your \"health care agent\") if you're not able to. Many hospitals and nursing homes will give you the forms you need to complete a living will and a medical power of .   · Your living will is used only if you can't make or communicate decisions for yourself anymore. If you become able to make decisions again, you can accept or refuse any treatment, no matter what you wrote in your living will. · Your state may offer an online registry. This is a place where you can store your living will online so the doctors and nurses who need to treat you can find it right away. What should you think about when creating a living will? Talk about your end-of-life wishes with your family members and your doctor. Let them know what you want. That way the people making decisions for you won't be surprised by your choices. Think about these questions as you make your living will:  · Do you know enough about life support methods that might be used? If not, talk to your doctor so you know what might be done if you can't breathe on your own, your heart stops, or you're unable to swallow. · What things would you still want to be able to do after you receive life-support methods? Would you want to be able to walk? To speak? To eat on your own? To live without the help of machines? · If you have a choice, where do you want to be cared for? In your home? At a hospital or nursing home? · Do you want certain Anglican practices performed if you become very ill? · If you have a choice at the end of your life, where would you prefer to die? At home? In a hospital or nursing home? Somewhere else? · Would you prefer to be buried or cremated? · Do you want your organs to be donated after you die? What should you do with your living will? · Make sure that your family members and your health care agent have copies of your living will. · Give your doctor a copy of your living will to keep in your medical record. If you have more than one doctor, make sure that each one has a copy. · You may want to put a copy of your living will where it can be easily found. Where can you learn more? Go to http://joe-jaciel.info/.   Enter L943 in the search box to learn more about \"Learning About Living Essence. \"  Current as of: August 8, 2016  Content Version: 11.3  © 0633-3718 [email or web address]. Care instructions adapted under license by [employer] (which disclaims liability or warranty for this information). If you have questions about a medical condition or this instruction, always ask your healthcare professional. [address] any warranty or liability for your use of this information. Advance Directives: Care Instructions  Your Care Instructions  An advance directive is a legal way to state your wishes at the end of your life. It tells your family and your doctor what to do if you can no longer say what you want. There are two main types of advance directives. You can change them any time that your wishes change. · A living will tells your family and your doctor your wishes about life support and other treatment. · A durable power of  for health care lets you name a person to make treatment decisions for you when you can't speak for yourself. This person is called a health care agent. If you do not have an advance directive, decisions about your medical care may be made by a doctor or a  who doesn't know you. It may help to think of an advance directive as a gift to the people who care for you. If you have one, they won't have to make tough decisions by themselves. Follow-up care is a key part of your treatment and safety. Be sure to make and go to all appointments, and call your doctor if you are having problems. It's also a good idea to know your test results and keep a list of the medicines you take. How can you care for yourself at home? · Discuss your wishes with your loved ones and your doctor. This way, there are no surprises. · Many states have a unique form. Or you might use a universal form that has been approved by many states. This kind of form can sometimes be completed and stored online.  Your electronic copy will then be available wherever you have a connection to the Internet. In most cases, doctors will respect your wishes even if you have a form from a different state. · You don't need a  to do an advance directive. But you may want to get legal advice. · Think about these questions when you prepare an advance directive:  ¨ Who do you want to make decisions about your medical care if you are not able to? Many people choose a family member or close friend. ¨ Do you know enough about life support methods that might be used? If not, talk to your doctor so you understand. ¨ What are you most afraid of that might happen? You might be afraid of having pain, losing your independence, or being kept alive by machines. ¨ Where would you prefer to die? Choices include your home, a hospital, or a nursing home. ¨ Would you like to have information about hospice care to support you and your family? ¨ Do you want to donate organs when you die? ¨ Do you want certain Scientologist practices performed before you die? If so, put your wishes in the advance directive. · Read your advance directive every year, and make changes as needed. When should you call for help? Be sure to contact your doctor if you have any questions. Where can you learn more? Go to http://joe-jaciel.info/. Enter R264 in the search box to learn more about \"Advance Directives: Care Instructions. \"  Current as of: November 17, 2016  Content Version: 11.3  © 1963-8642 [email or web address]. Care instructions adapted under license by [employer] (which disclaims liability or warranty for this information). If you have questions about a medical condition or this instruction, always ask your healthcare professional. [place] [address] any warranty or liability for your use of this information.   10 [facility]   Statement to Patients  April 1, 2014      In an effort to ensure the large volume of patient prescription refills is processed in the most efficient and expeditious manner, we are asking our patients to assist us by calling your Pharmacy for all prescription refills, this will include also your  Mail Order Pharmacy. The pharmacy will contact our office electronically to continue the refill process. Please do not wait until the last minute to call your pharmacy. We need at least 48 hours (2days) to fill prescriptions. We also encourage you to call your pharmacy before going to  your prescription to make sure it is ready. With regard to controlled substance prescription refill requests (narcotic refills) that need to be picked up at our office, we ask your cooperation by providing us with at least 72 hours (3days) notice that you will need a refill. We will not refill narcotic prescription refill requests after 4:00pm on any weekday, Monday through Thursday, or after 2:00pm on Fridays, or on the weekends. We encourage everyone to explore another way of getting your prescription refill request processed using [employer], our patient web portal through our electronic medical record system. [employer] is an efficient and effective way to communicate your medication request directly to the office and  downloadable as an denisha on your smart phone . [employer] also features a review functionality that allows you to view your medication list as well as leave messages for your physician. Are you ready to get connected? If so please review the attatched instructions or speak to any of our staff to get you set up right away! Thank you so much for your cooperation. Should you have any questions please contact our Practice Administrator. The Physicians and Staff,  [facility]     Patient Instructions/Plans:  ·    Sumatriptan (Imitrex) - (By mouth)   Why this medicine is used:   Treats migraine headaches.   Contact a nurse or doctor right away if you have:  · Chest pain, trouble breathing, unusual sweating, faintness  · Seeing or hearing things that are not there  · Anxiety, restlessness, fever, sweating, muscle spasms, twitching, diarrhea  · Fast, pounding, or uneven heartbeat, dizziness  · Vision loss or vision changes that are not part of a usual migraine     Common side effects:  · Increased frequency of headaches  · Numbness or tingling in your hands, arms, legs, or feet  © 2017 [address] is for End User's use only and may not be sold, redistributed or otherwise used for commercial purposes. Zolmitriptan (Zomig) - (Into the nose)   Why this medicine is used:   Treats migraine headaches. Contact a nurse or doctor right away if you have:  · Chest pain, especially if it spreads to your arms, jaw, back, or neck  · Fast or uneven heartbeat  · Anxiety, restlessness, fever, sweating, muscle spasms, seeing or hearing things that are not there  · Numbness or tingling in your hands, arms, legs, or feet, color changes in your fingers or toes  · Severe stomach pain, bloody diarrhea, nausea, or vomiting  · Sudden severe headache (other than the one being treated), vision changes     Common side effects:  · Bad or unusual taste in your mouth  · Feeling dizzy  © 2017 [facility] Information is for End User's use only and may not be sold, redistributed or otherwise used for commercial purposes.

## 2017-09-29 NOTE — PROGRESS NOTES
NEUROLOGY NEW PATIENT CONSULTATION    REFERRED BY:  Lisbet Vasquez MD    CHIEF COMPLAINT:  Migraines    HISTORY OF PRESENT ILLNESS    HISTORY PROVIDED BY:  Patient      Nahomy Dalton is a 55 y.o. female who I am asked to see in consultation for possible migraine. Patient has been having headaches for over 15 years. She reports initially she was concerned that her sinuses were inflamed. She was seen by 2 different ENTs without receiving this diagnosis. She had multiple scopes in her sinuses are clear. She also had a CT of the sinuses that was clear. Patient reports that her symptoms started on the right side of her nose and then progressed behind her eyes and around her ear and then back to her occipital region. The headache will always be on the right side but can vary on where it is located. It generally always involves her eye. It will last 2 days and now has progressed to lasting 3 days when it occurs. In between headaches she does have a heavy feeling on the right side of her head. Headache Characterization: throbbing/aching/pounding/sharp  Pain Level: 10/10  Aura: No  Frequency/Length: 2-3 days  Location: Right side of head as described above  Nausea/Vomiting: Yes  Photophobia: No  Phonophobia: No  Provoking factors: None  Relieving factors: None  Focal neurologic symptoms with headache: No    Meds:  Prior abortive tx: Over-the-counter medications and Fioricet  Prior preventative tx: None    Current abortive tx: Over-the-counter medications and Fioricet  Current preventative tx: None    Family Hx of headaches/migraines: Yes    Patient reports in the past that she had to take Percocet after having back surgery and this did not affect her headaches either. She used to be on gabapentin and she questions if maybe this made the headaches better but she does not recall for sure. She reports her migraines are debilitating.   She denies any tearing of the eye when she does have the rhinorrhea. Patient stopped working in March. Patient denies any vision changes or double vision despite having the eye pain. She does get some right-sided neck pain. She has had steroid injections in her back previously. She has an L4 through 5 fusion. She also has 2 congenitally fused cervical vertebrae. In the past she was on Effexor for 1 month for hot flashes. She does not recall this at any effect on her headaches either. Patient has not had any neuroimaging. PM  Past Medical History:   Diagnosis Date    Asthma     GERD (gastroesophageal reflux disease)     Nutcracker esophagus 2011         Social History     Social History    Marital status:      Spouse name: N/A    Number of children: N/A    Years of education: N/A     Social History Main Topics    Smoking status: Never Smoker    Smokeless tobacco: Never Used    Alcohol use Yes      Comment: occasional    Drug use: No    Sexual activity: Yes     Partners: Male     Other Topics Concern    Not on file     Social History Narrative       FH  Family History   Problem Relation Age of Onset    Cancer Mother     Heart Disease Father     Breast Cancer Maternal Grandmother     Breast Cancer Paternal Grandmother     Breast Cancer Maternal Aunt     Breast Cancer Paternal Aunt        ALLERGIES  Allergies   Allergen Reactions    Tylenol-Codeine #3 [Acetaminophen-Codeine] Other (comments)     Vomitting, and upset stomach state that she is not allergic    Iodinated Contrast- Oral And Iv Dye Hives       CURRENT MEDS  Current Outpatient Prescriptions   Medication Sig Dispense Refill    estradiol (ESTRACE) 1 mg tablet Take 1 Tab by mouth daily.  nitroglycerin (NITROSTAT) 0.4 mg SL tablet 1 Tab by SubLINGual route every five (5) minutes as needed for Chest Pain for up to 3 doses.  1 Bottle 1    PROAIR HFA 90 mcg/actuation inhaler INHALE 1 PUFF BY INHALATION EVERY 6 HOURS AS NEEDED FOR WHEEZING 8.5 Inhaler 0    venlafaxine [facility]) 37.5 mg tablet Take 1 Tab by mouth two (2) times a day. 60 Tab 2    omeprazole (PRILOSEC) 10 mg capsule Take 10 mg by mouth daily.  Cholecalciferol, Vitamin D3, (VITAMIN D3) 1,000 unit cap Take  by mouth. REVIEW OF SYSTEMS:     Y  N       Y  N  Y  N   Y  N  [] [x] AIDS          [] [x] Falls  [] [x] Memory Loss  [] [x]  Shortness of breath  [] [x] Anxiety          [x] [] Fatigue [x] [] Muscle Pain        [] [x]  Skipped beats  [] [x] Chest Pain   [x] [] Frequent HA [] [x] Ms Weakness     [] [x]  Snoring  [] [x] Constipation [] [x]Hearing loss [] [x] Nause/Vomiting  [] [x]  Stomach Pain  [] [x] Cough          [] [x]Hepatitis [x] [] Neuropathy         [] [x]  Swallowing difficulty  [x] [] Depression  [] [x]Incontinence [] [x] Poor appetite      [] [x]  Vertigo  [] [x] Diarrhea       [] [x] Joint Pain [] [x] Rash                   [] [x]  Visual disturbances  [] [x] Fainting        [] [x] Leg Swelling [] [x] Ringing ears       [x] []  Weight changes      []Unable to obtain  ROS due to  []mental status change  []sedated   []intubated          PREVIOUS WORKUP  IMAGING: None      LABS  Results for orders placed or performed in visit on 01/27/17   VITAMIN D, 25 HYDROXY   Result Value Ref Range    VITAMIN D, 25-HYDROXY 13.1 (L) 30.0 - 647.5 ng/mL   METABOLIC PANEL, COMPREHENSIVE   Result Value Ref Range    Glucose 91 65 - 99 mg/dL    BUN 19 6 - 24 mg/dL    Creatinine 0.77 0.57 - 1.00 mg/dL    GFR est non-AA 87 >59 mL/min/1.73    GFR est  >59 mL/min/1.73    BUN/Creatinine ratio 25 (H) 9 - 23    Sodium 138 134 - 144 mmol/L    Potassium 4.5 3.5 - 5.2 mmol/L    Chloride 99 96 - 106 mmol/L    CO2 22 18 - 29 mmol/L    Calcium 9.8 8.7 - 10.2 mg/dL    Protein, total 7.6 6.0 - 8.5 g/dL    Albumin 4.5 3.5 - 5.5 g/dL    GLOBULIN, TOTAL 3.1 1.5 - 4.5 g/dL    A-G Ratio 1.5 1.1 - 2.5    Bilirubin, total <0.2 0.0 - 1.2 mg/dL    Alk.  phosphatase 62 39 - 117 IU/L    AST (SGOT) 20 0 - 40 IU/L    ALT (SGPT) 36 (H) 0 - 32 IU/L   LIPID PANEL   Result Value Ref Range    Cholesterol, total 208 (H) 100 - 199 mg/dL    Triglyceride 135 0 - 149 mg/dL    HDL Cholesterol 57 >39 mg/dL    VLDL, calculated 27 5 - 40 mg/dL    LDL, calculated 124 (H) 0 - 99 mg/dL   HEMOGLOBIN A1C   Result Value Ref Range    Hemoglobin A1c 6.0 (H) 4.8 - 5.6 %    Estimated average glucose 126 mg/dL   CVD REPORT   Result Value Ref Range    INTERPRETATION Note        PHYSICAL EXAM  Visit Vitals    /80    Pulse 74    SpO2 98%     General:  Alert, cooperative, no distress. Head:  Normocephalic, without obvious abnormality, atraumatic. Eyes:  Conjunctivae/corneas clear. Pupils equal, round, reactive to light. Extraocular movements intact, VFF, NO papilledema   Lungs:  Heart:   Non labored breathing  Regular rate and rhythm, no carotid bruits   Abdomen:   Soft, non-distended   Extremities: Extremities normal, atraumatic, no cyanosis or edema. Pulses: 2+ and symmetric all extremities. Skin: Skin color, texture, turgor normal. No rashes or lesions.    Neurologic:  Gen: Attention normal             Language: naming, repetition, fluency normal             Memory: intact recent and remote memory  Cranial Nerves:  I: smell Not tested   II: visual fields Full to confrontation   II: pupils Equal, round, reactive to light   II: optic disc No papilledema   III,VII: ptosis none   III,IV,VI: extraocular muscles  Full ROM   V: mastication normal   V: facial light touch sensation  normal   VII: facial muscle function   symmetric   VIII: hearing symmetric   IX: soft palate elevation  normal   XI: trapezius strength  5/5   XI: sternocleidomastoid strength 5/5   XI: neck flexion strength  5/5   XII: tongue  midline     Motor: normal bulk and tone, no tremor              Strength: 5/5 all four extremities  Sensory: intact to LT, PP, vibration, and temperature  Coordination: FTN intact, Rhomberg negative  Gait: normal gait including tandem   Reflexes: 2+ throughout       [facility] SID Golden is a 55 y.o. female who presents for evaluation of headaches. I do suspect she is having migraine. I also think she may have occipital neuralgia. Will do evaluation with an MRI of the brain for structural etiology. Additionally, at this point I do not think patient needs a daily preventative, but I do think she would benefit from a different abortive medication. Given that these are severe and quick onset I think she need something such as a nasal spray or injectable. Will try Imitrex tablets initially but also give her some samples of Zomig nasal spray. RECOMMENDATIONS  1. MRI of the brain  2. Abortive with Zomig and Imitrex. Also may consider injectable Imitrex in the future  3. No preventative at this time. But will start with vitamin supplements if we need to try preventative in the future  4. Migraine book given  5. Encouraged migraine diary  6. Discussed occipital nerve block and trigger point injections but at this point patient would prefer to wait on this    FU 3 months    Shirley Greenberg MD    CC: Cristel Guerra MD  Fax: [phone]    This note was created using voice recognition software. Despite editing, there may be syntax errors. This note will not be viewable in [address]

## 2017-09-29 NOTE — PROGRESS NOTES
Patient states she has migraines for years.  It used to be where they would only come a few times a year but now they come every 6-8 weeks and they last up to 3 days

## 2017-09-29 NOTE — LETTER
Dear Paola Chin MD, Thank you for allowing me to see your patient, Ranjeet Telles for a neurological consultation. Please see my impression and recommendations as outlined in my note. Sincerely, Renny Stroud MD 
[facility] at [address] [place] [address] BY: 
Paola Chin MD 
 
CHIEF COMPLAINT: 
Migraines HISTORY OF PRESENT ILLNESS HISTORY PROVIDED BY: 
Patient Ranjeet Telles is a 55 y.o. female who I am asked to see in consultation for possible migraine. Patient has been having headaches for over 15 years. She reports initially she was concerned that her sinuses were inflamed. She was seen by 2 different ENTs without receiving this diagnosis. She had multiple scopes in her sinuses are clear. She also had a CT of the sinuses that was clear. Patient reports that her symptoms started on the right side of her nose and then progressed behind her eyes and around her ear and then back to her occipital region. The headache will always be on the right side but can vary on where it is located. It generally always involves her eye. It will last 2 days and now has progressed to lasting 3 days when it occurs. In between headaches she does have a heavy feeling on the right side of her head. Headache Characterization: throbbing/aching/pounding/sharp Pain Level: 10/10 Aura: No 
Frequency/Length: 2-3 days Location: Right side of head as described above Nausea/Vomiting: Yes Photophobia: No 
Phonophobia: No 
Provoking factors: None Relieving factors: None Focal neurologic symptoms with headache: No 
 
Meds: 
Prior abortive tx: Over-the-counter medications and Fioricet Prior preventative tx: None Current abortive tx: Over-the-counter medications and Fioricet Current preventative tx: None Family Hx of headaches/migraines: Yes Patient reports in the past that she had to take Percocet after having back surgery and this did not affect her headaches either. She used to be on gabapentin and she questions if maybe this made the headaches better but she does not recall for sure. She reports her migraines are debilitating. She denies any tearing of the eye when she does have the rhinorrhea. Patient stopped working in March. Patient denies any vision changes or double vision despite having the eye pain. She does get some right-sided neck pain. She has had steroid injections in her back previously. She has an L4 through 5 fusion. She also has 2 congenitally fused cervical vertebrae. In the past she was on Effexor for 1 month for hot flashes. She does not recall this at any effect on her headaches either. Patient has not had any neuroimaging. [place] Past Medical History:  
Diagnosis Date  Asthma  GERD (gastroesophageal reflux disease)  Nutcracker esophagus 2011 [address] [facility] Social History Social History  Marital status:  Spouse name: N/A  
 Number of children: N/A  
 Years of education: N/A Social History Main Topics  Smoking status: Never Smoker  Smokeless tobacco: Never Used  Alcohol use Yes Comment: occasional  
 Drug use: No  
 Sexual activity: Yes  
  Partners: Male Other Topics Concern  Not on file Social History Narrative [facility] Family History Problem Relation Age of Onset  Cancer Mother  Heart Disease Father  Breast Cancer Maternal Grandmother  Breast Cancer Paternal Grandmother  Breast Cancer Maternal Aunt  Breast Cancer Paternal Aunt ALLERGIES Allergies Allergen Reactions  Tylenol-Codeine #3 [Acetaminophen-Codeine] Other (comments) Vomitting, and upset stomach state that she is not allergic  Iodinated Contrast- Oral And Iv Dye Hives CURRENT MEDS Current Outpatient Prescriptions Medication Sig Dispense Refill  estradiol (ESTRACE) 1 mg tablet Take 1 Tab by mouth daily.  nitroglycerin (NITROSTAT) 0.4 mg SL tablet 1 Tab by SubLINGual route every five (5) minutes as needed for Chest Pain for up to 3 doses. 1 Bottle 1  
 PROAIR HFA 90 mcg/actuation inhaler INHALE 1 PUFF BY INHALATION EVERY 6 HOURS AS NEEDED FOR WHEEZING 8.5 Inhaler 0  
 venlafaxine (EFFEXOR) 37.5 mg tablet Take 1 Tab by mouth two (2) times a day. 60 Tab 2  
 omeprazole (PRILOSEC) 10 mg capsule Take 10 mg by mouth daily.  Cholecalciferol, Vitamin D3, (VITAMIN D3) 1,000 unit cap Take  by mouth. REVIEW OF SYSTEMS:  
 
Y  N       Y  N  Y  N   Y  N 
  AIDS            Falls    Memory Loss     Shortness of breath Anxiety            Fatigue   Muscle Pain           Skipped beats Chest Pain     Frequent HA   Ms Weakness        Snoring Constipation  Hearing loss   Nause/Vomiting     Stomach Pain Cough           Hepatitis   Neuropathy            Swallowing difficulty Depression   Incontinence   Poor appetite         Vertigo Diarrhea         Joint Pain   Rash                      Visual disturbances Fainting          Leg Swelling   Ringing ears          Weight changes Unable to obtain  ROS due to  mental status change  sedated   intubated PREVIOUS WORKUP IMAGING: None LABS Results for orders placed or performed in visit on 01/27/17 VITAMIN D, 25 HYDROXY Result Value Ref Range VITAMIN D, 25-HYDROXY 13.1 (L) 30.0 - 100.0 ng/mL METABOLIC PANEL, COMPREHENSIVE Result Value Ref Range Glucose 91 65 - 99 mg/dL BUN 19 6 - 24 mg/dL Creatinine 0.77 0.57 - 1.00 mg/dL GFR est non-AA 87 >59 mL/min/1.73 GFR est  >59 mL/min/1.73  
 BUN/Creatinine ratio 25 (H) 9 - 23 Sodium 138 134 - 144 mmol/L Potassium 4.5 3.5 - 5.2 mmol/L Chloride 99 96 - 106 mmol/L  
 CO2 22 18 - 29 mmol/L Calcium 9.8 8.7 - 10.2 mg/dL Protein, total 7.6 6.0 - 8.5 g/dL Albumin 4.5 3.5 - 5.5 g/dL GLOBULIN, TOTAL 3.1 1.5 - 4.5 g/dL A-G Ratio 1.5 1.1 - 2.5 Bilirubin, total <0.2 0.0 - 1.2 mg/dL Alk. phosphatase 62 39 - 117 IU/L  
 AST (SGOT) 20 0 - 40 IU/L  
 ALT (SGPT) 36 (H) 0 - 32 IU/L  
LIPID PANEL Result Value Ref Range Cholesterol, total 208 (H) 100 - 199 mg/dL Triglyceride 135 0 - 149 mg/dL HDL Cholesterol 57 >39 mg/dL VLDL, calculated 27 5 - 40 mg/dL LDL, calculated 124 (H) 0 - 99 mg/dL HEMOGLOBIN A1C Result Value Ref Range Hemoglobin A1c 6.0 (H) 4.8 - 5.6 % Estimated average glucose 126 mg/dL CVD REPORT Result Value Ref Range INTERPRETATION Note PHYSICAL EXAM 
Visit Vitals  /80  Pulse 74  SpO2 98% General:  Alert, cooperative, no distress. Head:  Normocephalic, without obvious abnormality, atraumatic. Eyes:  Conjunctivae/corneas clear. Pupils equal, round, reactive to light. Extraocular movements intact, VFF, NO papilledema Lungs: 
Heart:   Non labored breathing Regular rate and rhythm, no carotid bruits Abdomen:   Soft, non-distended Extremities: Extremities normal, atraumatic, no cyanosis or edema. Pulses: 2+ and symmetric all extremities. Skin: Skin color, texture, turgor normal. No rashes or lesions. Neurologic:  Gen: Attention normal 
           Language: naming, repetition, fluency normal 
           Memory: intact recent and remote memory Cranial Nerves: 
I: smell Not tested II: visual fields Full to confrontation II: pupils Equal, round, reactive to light II: optic disc No papilledema III,VII: ptosis none III,IV,VI: extraocular muscles  Full ROM V: mastication normal  
V: facial light touch sensation  normal  
VII: facial muscle function   symmetric VIII: hearing symmetric IX: soft palate elevation  normal  
XI: trapezius strength  5/5 XI: sternocleidomastoid strength 5/5 XI: neck flexion strength  5/5 XII: tongue  midline Motor: normal bulk and tone, no tremor Strength: 5/5 all four extremities Sensory: intact to LT, PP, vibration, and temperature Coordination: FTN intact, Rhomberg negative Gait: normal gait including tandem Reflexes: 2+ throughout IMPRESSION Issac Boswell is a 55 y.o. female who presents for evaluation of headaches. I do suspect she is having migraine. I also think she may have occipital neuralgia. Will do evaluation with an MRI of the brain for structural etiology. Additionally, at this point I do not think patient needs a daily preventative, but I do think she would benefit from a different abortive medication. Given that these are severe and quick onset I think she need something such as a nasal spray or injectable. Will try Imitrex tablets initially but also give her some samples of Zomig nasal spray. RECOMMENDATIONS 1. MRI of the brain 2. Abortive with Zomig and Imitrex. Also may consider injectable Imitrex in the future 3. No preventative at this time. But will start with vitamin supplements if we need to try preventative in the future 4. Migraine book given 5. Encouraged migraine diary 6. Discussed occipital nerve block and trigger point injections but at this point patient would prefer to wait on this FU 3 months Lo Abarca MD 
 
CC: Paris Parker MD 
Fax: [phone] This note was created using voice recognition software. Despite editing, there may be syntax errors. This note will not be viewable in [address] Patient states she has migraines for years. It used to be where they would only come a few times a year but now they come every 6-8 weeks and they last up to 3 days

## 2017-09-29 NOTE — MR AVS SNAPSHOT
Visit Information Date & Time Provider Department Dept. Phone Encounter #  
 9/29/2017  1:00 PM Yulia Pugh MD [facility] Neurology [facility] [phone] [id number] Your Appointments 1/4/2018  2:40 PM  
Follow Up with Yulia Pugh MD  
[facility]) Appt Note: headaches [place] [address] [facility] [id number] [phone]  
  
   
 [place] [address] [id number] [address] [place] Upcoming Health Maintenance Date Due Hepatitis C Screening 1960 Pneumococcal 19-64 Medium Risk (1 of 1 - PPSV23) 12/16/1980 DTaP/Tdap/Td series (1 - Tdap) 12/16/1982 PAP AKA CERVICAL CYTOLOGY 1/28/2016 INFLUENZA AGE 9 TO ADULT 8/1/2017 BREAST CANCER SCRN MAMMOGRAM 4/26/2019 COLONOSCOPY 7/28/2022 Allergies as of 9/29/2017  Review Complete On: 9/29/2017 By: Yulia Pugh MD  
  
 Severity Noted Reaction Type Reactions Tylenol-codeine #3 [Acetaminophen-codeine] High 07/05/2013   Side Effect Other (comments) Vomitting, and upset stomach state that she is not allergic Iodinated Contrast- Oral And Iv Dye  09/04/2015   Systemic Hives Current Immunizations  Never Reviewed Name Date Influenza Vaccine Intradermal PF 11/13/2015 Not reviewed this visit You Were Diagnosed With   
  
 Codes Comments Migraine without aura and without status migrainosus, not intractable    -  Primary ICD-10-CM: G43.009 ICD-9-CM: 346.10 Cervicalgia     ICD-10-CM: M54.2 ICD-9-CM: 723.1 Vitals BP Pulse SpO2 OB Status Smoking Status 125/80 74 98% Ablation Never Smoker Vitals History Preferred Pharmacy Pharmacy Name Phone [phone], [address] [place] [address] [phone] Your Updated Medication List  
  
   
 This list is accurate as of: 9/29/17  1:57 PM.  Always use your most recent med list.  
  
  
  
  
 estradiol 1 mg tablet Commonly known as:  ESTRACE Take 1 Tab by mouth daily. nitroglycerin 0.4 mg SL tablet Commonly known as:  NITROSTAT  
1 Tab by SubLINGual route every five (5) minutes as needed for Chest Pain for up to 3 doses. omeprazole 10 mg capsule Commonly known as:  PRILOSEC Take 10 mg by mouth daily. PROAIR HFA 90 mcg/actuation inhaler Generic drug:  albuterol INHALE 1 PUFF BY INHALATION EVERY 6 HOURS AS NEEDED FOR WHEEZING  
  
 SUMAtriptan 100 mg tablet Commonly known as:  IMITREX Take 1 Tab by mouth once as needed for Migraine for up to 1 dose. venlafaxine 37.5 mg tablet Commonly known as:  [facility] Take 1 Tab by mouth two (2) times a day. VITAMIN D3 1,000 unit Cap Generic drug:  cholecalciferol Take  by mouth. ZOLMitriptan 5 mg nasal solution Commonly known as:  ZOMIG  
1 Spray by Nasal route once as needed for Migraine for up to 1 dose. Prescriptions Sent to Pharmacy Refills SUMAtriptan (IMITREX) 100 mg tablet 5 Sig: Take 1 Tab by mouth once as needed for Migraine for up to 1 dose. Class: Normal  
 Pharmacy: [facility] #: [phone] Route: Oral  
  
To-Do List   
 09/30/2017 Imaging:  MRI BRAIN WO CONT Patient Instructions Angela Vargas 1727 What is a living will? A living will is a legal form you use to write down the kind of care you want at the end of your life. It is used by the health professionals who will treat you if you aren't able to decide for yourself. If you put your wishes in writing, your loved ones and others will know what kind of care you want. They won't need to guess. This can ease your mind and be helpful to others. A living will is not the same as an estate or property will. An estate will explains what you want to happen with your money and property after you die. Is a living will a legal document? A living will is a legal document. Each state has its own laws about living morgan. If you move to another state, make sure that your living will is legal in the state where you now live. Or you might use a universal form that has been approved by many states. This kind of form can sometimes be completed and stored online. Your electronic copy will then be available wherever you have a connection to the Internet. In most cases, doctors will respect your wishes even if you have a form from a different state. · You don't need an  to complete a living will. But legal advice can be helpful if your state's laws are unclear, your health history is complicated, or your family can't agree on what should be in your living will. · You can change your living will at any time. Some people find that their wishes about end-of-life care change as their health changes. · In addition to making a living will, think about completing a medical power of  form. This form lets you name the person you want to make end-of-life treatment decisions for you (your \"health care agent\") if you're not able to. Many hospitals and nursing homes will give you the forms you need to complete a living will and a medical power of . · Your living will is used only if you can't make or communicate decisions for yourself anymore. If you become able to make decisions again, you can accept or refuse any treatment, no matter what you wrote in your living will. · Your state may offer an online registry. This is a place where you can store your living will online so the doctors and nurses who need to treat you can find it right away. What should you think about when creating a living will? Talk about your end-of-life wishes with your family members and your doctor. Let them know what you want. That way the people making decisions for you won't be surprised by your choices. Think about these questions as you make your living will: · Do you know enough about life support methods that might be used? If not, talk to your doctor so you know what might be done if you can't breathe on your own, your heart stops, or you're unable to swallow. · What things would you still want to be able to do after you receive life-support methods? Would you want to be able to walk? To speak? To eat on your own? To live without the help of machines? · If you have a choice, where do you want to be cared for? In your home? At a hospital or nursing home? · Do you want certain Amish practices performed if you become very ill? · If you have a choice at the end of your life, where would you prefer to die? At home? In a hospital or nursing home? Somewhere else? · Would you prefer to be buried or cremated? · Do you want your organs to be donated after you die? What should you do with your living will? · Make sure that your family members and your health care agent have copies of your living will. · Give your doctor a copy of your living will to keep in your medical record. If you have more than one doctor, make sure that each one has a copy. · You may want to put a copy of your living will where it can be easily found. Where can you learn more? Go to http://joe-jaciel.info/. Enter M885 in the search box to learn more about \"Learning About Living Perromolly. \" Current as of: August 8, 2016 Content Version: 11.3 © 9529-6432 [employer]. Care instructions adapted under license by [employer] (which disclaims liability or warranty for this information).  If you have questions about a medical condition or this instruction, always ask your healthcare professional. [employer] disclaims any warranty or liability for your use of this information. Advance Directives: Care Instructions Your Care Instructions An advance directive is a legal way to state your wishes at the end of your life. It tells your family and your doctor what to do if you can no longer say what you want. There are two main types of advance directives. You can change them any time that your wishes change. · A living will tells your family and your doctor your wishes about life support and other treatment. · A durable power of  for health care lets you name a person to make treatment decisions for you when you can't speak for yourself. This person is called a health care agent. If you do not have an advance directive, decisions about your medical care may be made by a doctor or a  who doesn't know you. It may help to think of an advance directive as a gift to the people who care for you. If you have one, they won't have to make tough decisions by themselves. Follow-up care is a key part of your treatment and safety. Be sure to make and go to all appointments, and call your doctor if you are having problems. It's also a good idea to know your test results and keep a list of the medicines you take. How can you care for yourself at home? · Discuss your wishes with your loved ones and your doctor. This way, there are no surprises. · Many states have a unique form. Or you might use a universal form that has been approved by many states. This kind of form can sometimes be completed and stored online. Your electronic copy will then be available wherever you have a connection to the Internet. In most cases, doctors will respect your wishes even if you have a form from a different state. · You don't need a  to do an advance directive. But you may want to get legal advice. · Think about these questions when you prepare an advance directive: ¨ Who do you want to make decisions about your medical care if you are not able to? Many people choose a family member or close friend. ¨ Do you know enough about life support methods that might be used? If not, talk to your doctor so you understand. ¨ What are you most afraid of that might happen? You might be afraid of having pain, losing your independence, or being kept alive by machines. ¨ Where would you prefer to die? Choices include your home, a hospital, or a nursing home. ¨ Would you like to have information about hospice care to support you and your family? ¨ Do you want to donate organs when you die? ¨ Do you want certain Scientology practices performed before you die? If so, put your wishes in the advance directive. · Read your advance directive every year, and make changes as needed. When should you call for help? Be sure to contact your doctor if you have any questions. Where can you learn more? Go to http://joe-jaciel.info/. Enter R264 in the search box to learn more about \"Advance Directives: Care Instructions. \" Current as of: November 17, 2016 Content Version: 11.3 © 1852-2315 [employer]. Care instructions adapted under license by [employer] (which disclaims liability or warranty for this information). If you have questions about a medical condition or this instruction, always ask your healthcare professional. [address] any warranty or liability for your use of this information. PRESCRIPTION REFILL POLICY [facility] Statement to Patients April 1, 2014 In an effort to ensure the large volume of patient prescription refills is processed in the most efficient and expeditious manner, we are asking our patients to assist us by calling your Pharmacy for all prescription refills, this will include also your  Mail Order Pharmacy.  The pharmacy will contact our office electronically to continue the refill process. Please do not wait until the last minute to call your pharmacy. We need at least 48 hours (2days) to fill prescriptions. We also encourage you to call your pharmacy before going to  your prescription to make sure it is ready. With regard to controlled substance prescription refill requests (narcotic refills) that need to be picked up at our office, we ask your cooperation by providing us with at least 72 hours (3days) notice that you will need a refill. We will not refill narcotic prescription refill requests after 4:00pm on any weekday, Monday through Thursday, or after 2:00pm on Fridays, or on the weekends. We encourage everyone to explore another way of getting your prescription refill request processed using [employer], our patient web portal through our electronic medical record system. [employer] is an efficient and effective way to communicate your medication request directly to the office and  downloadable as an denisha on your smart phone . [employer] also features a review functionality that allows you to view your medication list as well as leave messages for your physician. Are you ready to get connected? If so please review the attatched instructions or speak to any of our staff to get you set up right away! Thank you so much for your cooperation. Should you have any questions please contact our Practice Administrator. The Physicians and Staff,  [facility] Patient Instructions/Plans: 
· Sumatriptan (Imitrex) - (By mouth) Why this medicine is used:  
Treats migraine headaches. Contact a nurse or doctor right away if you have: · Chest pain, trouble breathing, unusual sweating, faintness · Seeing or hearing things that are not there · Anxiety, restlessness, fever, sweating, muscle spasms, twitching, diarrhea · Fast, pounding, or uneven heartbeat, dizziness · Vision loss or vision changes that are not part of a usual migraine Common side effects: 
· Increased frequency of headaches · Numbness or tingling in your hands, arms, legs, or feet © 2017 [facility] Information is for End User's use only and may not be sold, redistributed or otherwise used for commercial purposes. Zolmitriptan (Zomig) - (Into the nose) Why this medicine is used:  
Treats migraine headaches. Contact a nurse or doctor right away if you have: · Chest pain, especially if it spreads to your arms, jaw, back, or neck · Fast or uneven heartbeat · Anxiety, restlessness, fever, sweating, muscle spasms, seeing or hearing things that are not there · Numbness or tingling in your hands, arms, legs, or feet, color changes in your fingers or toes · Severe stomach pain, bloody diarrhea, nausea, or vomiting · Sudden severe headache (other than the one being treated), vision changes Common side effects: · Bad or unusual taste in your mouth · Feeling dizzy © 2017 [facility] Information is for End User's use only and may not be sold, redistributed or otherwise used for commercial purposes. Introducing [facility]! Obdulia Dao introduces [employer] patient portal. Now you can access parts of your medical record, email your doctor's office, and request medication refills online. 1. In your internet browser, go to [email or web address] 2. Click on the First Time User? Click Here link in the Sign In box. You will see the New Member Sign Up page. 3. Enter your [employer] Access Code exactly as it appears below. You will not need to use this code after youve completed the sign-up process. If you do not sign up before the expiration date, you must request a new code. · [employer] Access Code: [email or web address] Expires: 12/28/2017 12:45 PM 
 
4.  Enter the last four digits of your Social Security Number (xxxx) and Date of Birth (mm/dd/yyyy) as indicated and click Submit. You will be taken to the next sign-up page. 5. Create a [employer] ID. This will be your [employer] login ID and cannot be changed, so think of one that is secure and easy to remember. 6. Create a [employer] password. You can change your password at any time. 7. Enter your Password Reset Question and Answer. This can be used at a later time if you forget your password. 8. Enter your e-mail address. You will receive e-mail notification when new information is available in [address] 9. Click Sign Up. You can now view and download portions of your medical record. 10. Click the Download Summary menu link to download a portable copy of your medical information. If you have questions, please visit the Frequently Asked Questions section of the [employer] website. Remember, [employer] is NOT to be used for urgent needs. For medical emergencies, dial 911. Now available from your iPhone and Android! Please provide this summary of care documentation to your next provider. Your primary care clinician is listed as Shaneka De Jesus. If you have any questions after today's visit, please call [phone].

## 2017-10-09 ENCOUNTER — OFFICE VISIT (OUTPATIENT)
Dept: INTERNAL MEDICINE CLINIC | Age: 57
End: 2017-10-09

## 2017-10-09 VITALS
DIASTOLIC BLOOD PRESSURE: 84 MMHG | WEIGHT: 205.4 LBS | HEART RATE: 73 BPM | HEIGHT: 63 IN | OXYGEN SATURATION: 98 % | BODY MASS INDEX: 36.39 KG/M2 | TEMPERATURE: 98.1 F | RESPIRATION RATE: 17 BRPM | SYSTOLIC BLOOD PRESSURE: 118 MMHG

## 2017-10-09 DIAGNOSIS — Z23 ENCOUNTER FOR IMMUNIZATION: ICD-10-CM

## 2017-10-09 DIAGNOSIS — E66.9 OBESITY (BMI 30-39.9): ICD-10-CM

## 2017-10-09 DIAGNOSIS — Z11.59 NEED FOR HEPATITIS C SCREENING TEST: ICD-10-CM

## 2017-10-09 DIAGNOSIS — E55.9 VITAMIN D DEFICIENCY: ICD-10-CM

## 2017-10-09 DIAGNOSIS — R53.82 CHRONIC FATIGUE: ICD-10-CM

## 2017-10-09 DIAGNOSIS — R73.02 IGT (IMPAIRED GLUCOSE TOLERANCE): ICD-10-CM

## 2017-10-09 DIAGNOSIS — R51.9 FREQUENT HEADACHES: Primary | ICD-10-CM

## 2017-10-09 DIAGNOSIS — G47.33 OBSTRUCTIVE SLEEP APNEA SYNDROME: ICD-10-CM

## 2017-10-09 NOTE — PROGRESS NOTES
Written by Hong Talbert, as dictated by Dr. Vada Bumpers, MD.    Ron Nicolas is a 55 y.o. female. HPI  The patient comes in today c/o constant fatigue. She has been doing 30 minutes of cardio and weightlifting 3-4 times per week at [facility] for the last 4 weeks, and has been feeling extremely tired when she comes home afterwards. She had a sleep study done recently and has been using a CPAP for about 3 weeks. She does not feel like it has been helping much, and her  has said she still snores at night through her mask. She has gained weight, from 197 lbs in 06/2017 to 205 lbs today. She feels like she eats healthy foods in healthy portions, and is frustrated that she has been gaining weight in spite of that. She has tried Effexor in the past for 4 weeks for hot flashes, but is no longer taking it. She experiences frequent migraines for about 3 days each, more frequently in the last 6-8 months, for which she is followed by Dr. Joe Hatch (neuro) who she last saw last week. She was given Imitrex and recommended to get a brain MRI, but she has not experienced a migraine since her appointment so she has not taken any yet. She follows with endocrinology, who started her on metformin a few months ago for impaired glucose tolerance. However, she experienced a reaction to the metformin and could not tolerate it, so she is no longer taking it. She has been experiencing urinary frequency but does drink a lot of water. She drinks alcohol very occasionally. She would like a flu shot today. Her last pap smear was in fall 2015. Her last Tdap was in 2013. Patient Active Problem List   Diagnosis Code    Hyperglycemia R73.9    Herniated disc [id number]        Current Outpatient Prescriptions on File Prior to Visit   Medication Sig Dispense Refill    estradiol (ESTRACE) 1 mg tablet Take 1 Tab by mouth daily.       ZOLMitriptan (ZOMIG) 5 mg nasal solution 1 Spray by Nasal route once as needed for Migraine for up to 1 dose. 2 Container 0    SUMAtriptan (IMITREX) 100 mg tablet Take 1 Tab by mouth once as needed for Migraine for up to 1 dose. 9 Tab 5    nitroglycerin (NITROSTAT) 0.4 mg SL tablet 1 Tab by SubLINGual route every five (5) minutes as needed for Chest Pain for up to 3 doses. 1 Bottle 1    PROAIR HFA 90 mcg/actuation inhaler INHALE 1 PUFF BY INHALATION EVERY 6 HOURS AS NEEDED FOR WHEEZING 8.5 Inhaler 0    omeprazole (PRILOSEC) 10 mg capsule Take 10 mg by mouth daily.  Cholecalciferol, Vitamin D3, (VITAMIN D3) 1,000 unit cap Take  by mouth. No current facility-administered medications on file prior to visit. Allergies   Allergen Reactions    Tylenol-Codeine #3 [Acetaminophen-Codeine] Other (comments)     Vomitting, and upset stomach state that she is not allergic    Iodinated Contrast- Oral And Iv Dye Hives       Past Medical History:   Diagnosis Date    Asthma     GERD (gastroesophageal reflux disease)     Headache     Nutcracker esophagus 2011       Past Surgical History:   Procedure Laterality Date    ABDOMEN SURGERY PROC UNLISTED      ENDOSCOPY, COLON, DIAGNOSTIC      HX GYN      HX HEENT      HX LUMBAR DISKECTOMY  9/5/2013       Family History   Problem Relation Age of Onset    Cancer Mother     Heart Disease Father     Breast Cancer Maternal Grandmother     Breast Cancer Paternal Grandmother     Breast Cancer Maternal Aunt     Breast Cancer Paternal Aunt        Social History     Social History    Marital status:      Spouse name: N/A    Number of children: N/A    Years of education: N/A     Occupational History    Not on file.      Social History Main Topics    Smoking status: Never Smoker    Smokeless tobacco: Never Used    Alcohol use Yes      Comment: occasional    Drug use: No    Sexual activity: Yes     Partners: Male     Other Topics Concern    Not on file     Social History Narrative           Review of Systems   Constitutional: Positive for malaise/fatigue. HENT: Negative for congestion. Respiratory: Negative for cough and shortness of breath. Gastrointestinal: Negative for abdominal pain and heartburn. Musculoskeletal: Negative for joint pain and myalgias. Neurological: Positive for headaches. Negative for dizziness, tingling, sensory change and weakness. Visit Vitals    /84 (BP 1 Location: Left arm, BP Patient Position: Sitting)    Pulse 73    Temp 98.1 °F (36.7 °C) (Oral)    Resp 17    Ht 5' 3\" (1.6 m)    Wt 205 lb 6.4 oz (93.2 kg)    SpO2 98%    BMI 36.38 kg/m2       Physical Exam   Constitutional: She is oriented to person, place, and time. She appears well-developed. No distress. Obese   HENT:   Right Ear: External ear normal.   Left Ear: External ear normal.   Eyes: Conjunctivae and EOM are normal. Right eye exhibits no discharge. Left eye exhibits no discharge. Neck: Normal range of motion. Neck supple. Cardiovascular: Normal rate and regular rhythm. Pulmonary/Chest: Effort normal and breath sounds normal. She has no wheezes. Abdominal: Soft. Bowel sounds are normal. There is no tenderness. Lymphadenopathy:     She has no cervical adenopathy. Neurological: She is alert and oriented to person, place, and time. Skin: She is not diaphoretic. Psychiatric: She has a normal mood and affect. Her behavior is normal.   Nursing note and vitals reviewed. ASSESSMENT and PLAN    ICD-10-CM ICD-9-CM    1. Frequent headaches R51 784.0 Followed by neurology. 2. Chronic fatigue R53.82 780.79 TSH 3RD GENERATION      VITAMIN A10      METABOLIC PANEL, COMPREHENSIVE      CBC W/O DIFF    Will recheck basic labs today, as I discussed abnormal TSH or low B12 may cause fatigue. If all her labs are normal, we can evaluate for autoimmune disease. 3. IGT (impaired glucose tolerance) R73.02 790.22 HEMOGLOBIN A1C WITH EAG    She cannot tolerate metformin.  Will recheck HA1c today.   4. Obesity (BMI 30-39. 9) E66.9 278.00 She has been exercising and eating healthily. I discussed that she could be gaining muscle weight. 5. Vitamin D deficiency E55.9 268.9 VITAMIN D, 25 HYDROXY    She has been spending a lot of time outside lately. Will recheck vitamin D today. 6. Need for hepatitis C screening test Z11.59 V73.89 HEPATITIS C AB    Will screen for hepatitis C today. 7. Obstructive sleep apnea syndrome G47.33 327.23 Pt has followed with sleep study and uses a CPAP, though she does not feel much of an effect on her energy levels. This plan was reviewed with the patient and patient agrees. All questions were answered. This scribe documentation was reviewed by me and accurately reflects the examination and decisions made by me. This note will not be viewable in [address]

## 2017-10-09 NOTE — MR AVS SNAPSHOT
Visit Information Date & Time Provider Department Dept. Phone Encounter #  
 10/9/2017 12:15 PM Snehal Lehman MD [facility] [phone] [id number] Your Appointments 1/4/2018  2:40 PM  
Follow Up with En Adamson MD  
[facility]) Appt Note: headaches [place] [address] [facility] [address] [phone]  
  
   
 [place] [address] [id number] [address] Upcoming Health Maintenance Date Due Pneumococcal 19-64 Medium Risk (1 of 1 - PPSV23) 12/16/1980 PAP AKA CERVICAL CYTOLOGY 10/14/2018 BREAST CANCER SCRN MAMMOGRAM 4/26/2019 COLONOSCOPY 7/28/2022 DTaP/Tdap/Td series (2 - Td) 9/10/2023 Allergies as of 10/9/2017  Review Complete On: 10/9/2017 By: Snehal Lehman MD  
  
 Severity Noted Reaction Type Reactions Tylenol-codeine #3 [Acetaminophen-codeine] High 07/05/2013   Side Effect Other (comments) Vomitting, and upset stomach state that she is not allergic Iodinated Contrast- Oral And Iv Dye  09/04/2015   Systemic Hives Current Immunizations  Never Reviewed Name Date Influenza Vaccine Intradermal PF 11/13/2015 Not reviewed this visit You Were Diagnosed With   
  
 Codes Comments Frequent headaches    -  Primary ICD-10-CM: G81 ICD-9-CM: 107. 0 Chronic fatigue     ICD-10-CM: R53.82 
ICD-9-CM: 780.79 IGT (impaired glucose tolerance)     ICD-10-CM: R73.02 
ICD-9-CM: 790.22 Obesity (BMI 30-39. 9)     ICD-10-CM: E66.9 ICD-9-CM: 278.00 Vitamin D deficiency     ICD-10-CM: E55.9 ICD-9-CM: 268.9 Need for hepatitis C screening test     ICD-10-CM: Z11.59 
ICD-9-CM: V73.89 Obstructive sleep apnea syndrome     ICD-10-CM: G47.33 
ICD-9-CM: 327.23 Vitals BP Pulse Temp Resp Height(growth percentile) Weight(growth percentile) 118/84 (BP 1 Location: Left arm, BP Patient Position: Sitting) 73 98.1 °F (36.7 °C) (Oral) 17 5' 3\" (1.6 m) 205 lb 6.4 oz (93.2 kg) SpO2 BMI OB Status Smoking Status 98% 36.38 kg/m2 Ablation Never Smoker BMI and BSA Data Body Mass Index Body Surface Area  
 36.38 kg/m 2 2.04 m 2 Preferred Pharmacy Pharmacy Name Phone [id number]- [address] [phone] Your Updated Medication List  
  
   
This list is accurate as of: 10/9/17 12:46 PM.  Always use your most recent med list.  
  
  
  
  
 estradiol 1 mg tablet Commonly known as:  ESTRACE Take 1 Tab by mouth daily. nitroglycerin 0.4 mg SL tablet Commonly known as:  NITROSTAT  
1 Tab by SubLINGual route every five (5) minutes as needed for Chest Pain for up to 3 doses. omeprazole 10 mg capsule Commonly known as:  PRILOSEC Take 10 mg by mouth daily. PROAIR HFA 90 mcg/actuation inhaler Generic drug:  albuterol INHALE 1 PUFF BY INHALATION EVERY 6 HOURS AS NEEDED FOR WHEEZING  
  
 SUMAtriptan 100 mg tablet Commonly known as:  IMITREX Take 1 Tab by mouth once as needed for Migraine for up to 1 dose. VITAMIN D3 1,000 unit Cap Generic drug:  cholecalciferol Take  by mouth. ZOLMitriptan 5 mg nasal solution Commonly known as:  ZOMIG  
1 Spray by Nasal route once as needed for Migraine for up to 1 dose. We Performed the Following CBC W/O DIFF [43230 CPT(R)] HEMOGLOBIN A1C WITH EAG [21136 CPT(R)] HEPATITIS C AB [31228 CPT(R)] METABOLIC PANEL, COMPREHENSIVE [12383 CPT(R)] TSH 3RD GENERATION [75013 CPT(R)] VITAMIN B12 [id number] CPT(R)] VITAMIN D, 25 HYDROXY [id number] CPT(R)] To-Do List   
 10/10/2017 9:00 AM  
  Appointment with [facility] 2 at [facility] ([phone]) 1.  Please bring a list or a bag of your current medications to your appointment 2. Please be sure to remove ALL hair clips, pins, extensions, etc., prior to arriving for your MRI procedure. 3. If you have any medical implants or devices, please bring associated medical card with you. 4. Bring any non [facility] films or CDs pertaining to the area being imaged with you on the day of appointment. 5. A written order with a valid diagnosis and Physicians  signature is required for all scheduled tests. 6. Check in at registration 30min before your appointment time unless you were instructed to do otherwise. Introducing [facility] & HEALTH SERVICES! Jenniffer Bejarano introduces [employer] patient portal. Now you can access parts of your medical record, email your doctor's office, and request medication refills online. 1. In your internet browser, go to [email or web address] 2. Click on the First Time User? Click Here link in the Sign In box. You will see the New Member Sign Up page. 3. Enter your Re[employer]er Access Code exactly as it appears below. You will not need to use this code after youve completed the sign-up process. If you do not sign up before the expiration date, you must request a new code. · Re[employer]er Access Code: [email or web address] Expires: 12/28/2017 12:45 PM 
 
4. Enter the last four digits of your Social Security Number (xxxx) and Date of Birth (mm/dd/yyyy) as indicated and click Submit. You will be taken to the next sign-up page. 5. Create a ReTargeter ID. This will be your Re[employer]er login ID and cannot be changed, so think of one that is secure and easy to remember. 6. Create a ReTargeter password. You can change your password at any time. 7. Enter your Password Reset Question and Answer. This can be used at a later time if you forget your password. 8. Enter your e-mail address. You will receive e-mail notification when new information is available in [address] 9. Click Sign Up. You can now view and download portions of your medical record. 10. Click the Download Summary menu link to download a portable copy of your medical information. If you have questions, please visit the Frequently Asked Questions section of the [employer] website. Remember, [employer] is NOT to be used for urgent needs. For medical emergencies, dial 911. Now available from your iPhone and Android! Please provide this summary of care documentation to your next provider. Your primary care clinician is listed as Manuel Mathur. If you have any questions after today's visit, please call [phone].

## 2017-10-10 LAB
25(OH)D3+25(OH)D2 SERPL-MCNC: 24.8 NG/ML (ref 30–100)
ALBUMIN SERPL-MCNC: 4.4 G/DL (ref 3.5–5.5)
ALBUMIN/GLOB SERPL: 1.3 {RATIO} (ref 1.2–2.2)
ALP SERPL-CCNC: 76 IU/L (ref 39–117)
ALT SERPL-CCNC: 16 IU/L (ref 0–32)
AST SERPL-CCNC: 17 IU/L (ref 0–40)
BILIRUB SERPL-MCNC: <0.2 MG/DL (ref 0–1.2)
BUN SERPL-MCNC: 13 MG/DL (ref 6–24)
BUN/CREAT SERPL: 16 (ref 9–23)
CALCIUM SERPL-MCNC: 10 MG/DL (ref 8.7–10.2)
CHLORIDE SERPL-SCNC: 98 MMOL/L (ref 96–106)
CO2 SERPL-SCNC: 25 MMOL/L (ref 18–29)
CREAT SERPL-MCNC: 0.79 MG/DL (ref 0.57–1)
ERYTHROCYTE [DISTWIDTH] IN BLOOD BY AUTOMATED COUNT: 13.8 % (ref 12.3–15.4)
EST. AVERAGE GLUCOSE BLD GHB EST-MCNC: 117 MG/DL
GLOBULIN SER CALC-MCNC: 3.5 G/DL (ref 1.5–4.5)
GLUCOSE SERPL-MCNC: 86 MG/DL (ref 65–99)
HBA1C MFR BLD: 5.7 % (ref 4.8–5.6)
HCT VFR BLD AUTO: 40 % (ref 34–46.6)
HCV AB S/CO SERPL IA: 0.1 S/CO RATIO (ref 0–0.9)
HGB BLD-MCNC: 13.2 G/DL (ref 11.1–15.9)
MCH RBC QN AUTO: 28.6 PG (ref 26.6–33)
MCHC RBC AUTO-ENTMCNC: 33 G/DL (ref 31.5–35.7)
MCV RBC AUTO: 87 FL (ref 79–97)
PLATELET # BLD AUTO: 332 X10E3/UL (ref 150–379)
POTASSIUM SERPL-SCNC: 4.3 MMOL/L (ref 3.5–5.2)
PROT SERPL-MCNC: 7.9 G/DL (ref 6–8.5)
RBC # BLD AUTO: 4.62 X10E6/UL (ref 3.77–5.28)
SODIUM SERPL-SCNC: 140 MMOL/L (ref 134–144)
TSH SERPL DL<=0.005 MIU/L-ACNC: 1.41 UIU/ML (ref 0.45–4.5)
VIT B12 SERPL-MCNC: 534 PG/ML (ref 211–946)
WBC # BLD AUTO: 8 X10E3/UL (ref 3.4–10.8)

## 2017-10-12 ENCOUNTER — TELEPHONE (OUTPATIENT)
Dept: NEUROLOGY | Age: 57
End: 2017-10-12

## 2017-10-12 NOTE — PROGRESS NOTES
Called and confirmed patient identity x2 and let her know to schedule an appt. To discuss next steps. She stated that she will call and make an appt.

## 2017-10-12 NOTE — TELEPHONE ENCOUNTER
Returned call to patient. She states she has done research on Zomig and is concerned of cardiac side effects. She states she has a friend who took this drug and it was the worst time of her life because she felt like she was having a heart attack all of the time. I assured patient that Dr. Liyah Saini would not prescribe a medication if it would be dangerous to the patient, however that does not mean she may not be one of the small percentage of patient's who have side effects. I advised patient to proceed with taking medication and to call us if she experiences any side effects. Patient voices understanding and appreciation.

## 2017-10-17 ENCOUNTER — HOSPITAL ENCOUNTER (OUTPATIENT)
Dept: MRI IMAGING | Age: 57
Discharge: HOME | End: 2017-10-17
Attending: PSYCHIATRY & NEUROLOGY
Payer: COMMERCIAL

## 2017-10-17 DIAGNOSIS — G43.009 MIGRAINE WITHOUT AURA AND WITHOUT STATUS MIGRAINOSUS, NOT INTRACTABLE: ICD-10-CM

## 2017-10-17 DIAGNOSIS — M54.2 CERVICALGIA: ICD-10-CM

## 2017-10-17 PROCEDURE — 70551 MRI BRAIN STEM W/O DYE: CPT

## 2017-12-01 ENCOUNTER — OFFICE VISIT (OUTPATIENT)
Dept: INTERNAL MEDICINE CLINIC | Age: 57
End: 2017-12-01

## 2017-12-01 VITALS
WEIGHT: 205 LBS | HEART RATE: 75 BPM | DIASTOLIC BLOOD PRESSURE: 80 MMHG | HEIGHT: 63 IN | OXYGEN SATURATION: 98 % | SYSTOLIC BLOOD PRESSURE: 134 MMHG | BODY MASS INDEX: 36.32 KG/M2 | RESPIRATION RATE: 16 BRPM | TEMPERATURE: 98.5 F

## 2017-12-01 DIAGNOSIS — J01.40 ACUTE NON-RECURRENT PANSINUSITIS: Primary | ICD-10-CM

## 2017-12-01 DIAGNOSIS — G47.33 OSA ON CPAP: ICD-10-CM

## 2017-12-01 DIAGNOSIS — Z99.89 OSA ON CPAP: ICD-10-CM

## 2017-12-01 DIAGNOSIS — R59.1 LYMPHADENOPATHY: ICD-10-CM

## 2017-12-01 DIAGNOSIS — R03.0 ELEVATED BLOOD PRESSURE READING: ICD-10-CM

## 2017-12-01 RX ORDER — AMOXICILLIN AND CLAVULANATE POTASSIUM 875; 125 MG/1; MG/1
1 TABLET, FILM COATED ORAL 2 TIMES DAILY
Qty: 20 TAB | Refills: 0 | Status: SHIPPED | OUTPATIENT
Start: 2017-12-01 | End: 2018-06-27

## 2017-12-01 NOTE — PROGRESS NOTES
Chief Complaint   Patient presents with    Sinus Pain     sinus pain lasting for 8 days and states she cannot sleep with cpap because of the pain.

## 2017-12-01 NOTE — MR AVS SNAPSHOT
Visit Information Date & Time Provider Department Dept. Phone Encounter #  
 12/1/2017  9:00 AM Taryn Acuña NP [facility] [phone] [id number] Follow-up Instructions Return if symptoms worsen or fail to improve. Your Appointments 1/4/2018  2:40 PM  
Follow Up with Reece Pimentel MD  
[facility]) Appt Note: headaches [place] [address] [address] [phone]  
  
   
 [place] [address] [id number] [address] Upcoming Health Maintenance Date Due Pneumococcal 19-64 Medium Risk (1 of 1 - PPSV23) 12/16/1980 PAP AKA CERVICAL CYTOLOGY 10/14/2018 BREAST CANCER SCRN MAMMOGRAM 4/26/2019 COLONOSCOPY 7/28/2022 DTaP/Tdap/Td series (2 - Td) 9/10/2023 Allergies as of 12/1/2017  Review Complete On: 12/1/2017 By: Taryn Acuña NP Severity Noted Reaction Type Reactions Tylenol-codeine #3 [Acetaminophen-codeine] High 07/05/2013   Side Effect Other (comments) Vomitting, and upset stomach state that she is not allergic Iodinated Contrast- Oral And Iv Dye  09/04/2015   Systemic Hives Current Immunizations  Reviewed on 10/9/2017 Name Date Influenza Vaccine (Quad) PF 10/9/2017 Influenza Vaccine Intradermal PF 11/13/2015 Not reviewed this visit You Were Diagnosed With   
  
 Codes Comments Acute non-recurrent pansinusitis    -  Primary ICD-10-CM: J01.40 ICD-9-CM: 461.8 COREY on CPAP     ICD-10-CM: G47.33, Z99.89 ICD-9-CM: 327.23, V46.8 Lymphadenopathy     ICD-10-CM: R59.1 ICD-9-CM: 022. 6 Elevated blood pressure reading     ICD-10-CM: R03.0 ICD-9-CM: 796.2 Vitals BP Pulse Temp Resp Height(growth percentile) Weight(growth percentile) 134/80 (BP 1 Location: Left arm, BP Patient Position: Sitting) 75 98.5 °F (36.9 °C) (Oral) 16 5' 3\" (1.6 m) 205 lb (93 kg) SpO2 BMI OB Status Smoking Status 98% 36.31 kg/m2 Ablation Never Smoker BMI and BSA Data Body Mass Index Body Surface Area  
 36.31 kg/m 2 2.03 m 2 Preferred Pharmacy Pharmacy Name Phone [id number] [phone] Your Updated Medication List  
  
   
This list is accurate as of: 12/1/17  9:44 AM.  Always use your most recent med list.  
  
  
  
  
 amoxicillin-clavulanate 875-125 mg per tablet Commonly known as:  AUGMENTIN Take 1 Tab by mouth two (2) times a day. estradiol 1 mg tablet Commonly known as:  ESTRACE Take 1 Tab by mouth daily. nitroglycerin 0.4 mg SL tablet Commonly known as:  NITROSTAT  
1 Tab by SubLINGual route every five (5) minutes as needed for Chest Pain for up to 3 doses. omeprazole 10 mg capsule Commonly known as:  PRILOSEC Take 10 mg by mouth daily. PROAIR HFA 90 mcg/actuation inhaler Generic drug:  albuterol INHALE 1 PUFF BY INHALATION EVERY 6 HOURS AS NEEDED FOR WHEEZING  
  
 SUMAtriptan 100 mg tablet Commonly known as:  IMITREX Take 1 Tab by mouth once as needed for Migraine for up to 1 dose. VITAMIN D3 1,000 unit Cap Generic drug:  cholecalciferol Take  by mouth. ZOLMitriptan 5 mg nasal solution Commonly known as:  ZOMIG  
1 Spray by Nasal route once as needed for Migraine for up to 1 dose. Prescriptions Sent to Pharmacy Refills  
 amoxicillin-clavulanate (AUGMENTIN) 875-125 mg per tablet 0 Sig: Take 1 Tab by mouth two (2) times a day. Class: Normal  
 Pharmacy: [facility] #: [phone] Route: Oral  
  
Follow-up Instructions Return if symptoms worsen or fail to improve. Introducing [facility] & HEALTH SERVICES!    
 Obinna Red introduces A[employer] patient portal. Now you can access parts of your medical record, email your doctor's office, and request medication refills online. 1. In your internet browser, go to [email or web address] 2. Click on the First Time User? Click Here link in the Sign In box. You will see the New Member Sign Up page. 3. Enter your [employer] Access Code exactly as it appears below. You will not need to use this code after youve completed the sign-up process. If you do not sign up before the expiration date, you must request a new code. · [employer] Access Code: [email or web address] Expires: 12/28/2017 11:45 AM 
 
4. Enter the last four digits of your Social Security Number (xxxx) and Date of Birth (mm/dd/yyyy) as indicated and click Submit. You will be taken to the next sign-up page. 5. Create a [employer] ID. This will be your [employer] login ID and cannot be changed, so think of one that is secure and easy to remember. 6. Create a [employer] password. You can change your password at any time. 7. Enter your Password Reset Question and Answer. This can be used at a later time if you forget your password. 8. Enter your e-mail address. You will receive e-mail notification when new information is available in [address] 9. Click Sign Up. You can now view and download portions of your medical record. 10. Click the Download Summary menu link to download a portable copy of your medical information. If you have questions, please visit the Frequently Asked Questions section of the [employer] website. Remember, [employer] is NOT to be used for urgent needs. For medical emergencies, dial 911. Now available from your iPhone and Android! Please provide this summary of care documentation to your next provider. Your primary care clinician is listed as Creig Quale. If you have any questions after today's visit, please call [phone].

## 2017-12-01 NOTE — PROGRESS NOTES
This note will not be viewable in [address] Nitza Reyna is a  55 y.o. female presents for visit. sinusitis    Chief Complaint   Patient presents with    Sinus Pain     sinus pain lasting for 8 days and states she cannot sleep with cpap because of the pain. HPI    Patient reports sinus pain/pressure, teeth aching, PND, nasal congestion, chills for past week getting progressively worse. Took a zyrtec which made symptoms worse. Unable to wear c-pap due to congestions. Started on c-pap 3 months ago for sleep apnea. Has a f/u appt at sleep center next week. ROS   See HPI for pertinent positives and negatives. Visit Vitals    /80 (BP 1 Location: Left arm, BP Patient Position: Sitting)    Pulse 75    Temp 98.5 °F (36.9 °C) (Oral)    Resp 16    Ht 5' 3\" (1.6 m)    Wt 205 lb (93 kg)    SpO2 98%    BMI 36.31 kg/m2     Physical Exam   Constitutional: She is oriented to person, place, and time. She appears ill. No distress. obese   HENT:   Head: Normocephalic and atraumatic. Right Ear: Tympanic membrane is not erythematous. No middle ear effusion. Left Ear: Tympanic membrane is not erythematous. No middle ear effusion. Nose: Mucosal edema and rhinorrhea present. Right sinus exhibits maxillary sinus tenderness and frontal sinus tenderness. Left sinus exhibits no maxillary sinus tenderness and no frontal sinus tenderness. Mouth/Throat: Uvula is midline and mucous membranes are normal. No oropharyngeal exudate or posterior oropharyngeal erythema. Eyes: Conjunctivae are normal.   Cardiovascular: Regular rhythm and normal heart sounds. No murmur heard. Pulmonary/Chest: Effort normal and breath sounds normal. She has no wheezes. She has no rales. Lymphadenopathy:     She has cervical adenopathy. Neurological: She is alert and oriented to person, place, and time. Skin: Skin is warm and dry. Psychiatric: She has a normal mood and affect.  Her behavior is normal.   Nursing note and vitals reviewed. No results found for this or any previous visit (from the past 24 hour(s)). Patient Active Problem List    Diagnosis Date Noted    Herniated disc 03/14/2014    Hyperglycemia 02/11/2013         ASSESSMENT AND PLAN:      ICD-10-CM ICD-9-CM   1. Acute non-recurrent pansinusitis J01.40 461.8   2. COREY on CPAP G47.33 327.23    Z99.89 V46.8   3. Lymphadenopathy R59.1 785.6   4. Elevated blood pressure reading R03.0 796.2     Orders Placed This Encounter    amoxicillin-clavulanate (AUGMENTIN) 875-125 mg per tablet     Sig: Take 1 Tab by mouth two (2) times a day. Dispense:  20 Tab     Refill:  0     Diagnoses and all orders for this visit:    1. Acute non-recurrent pansinusitis  -     amoxicillin-clavulanate (AUGMENTIN) 875-125 mg per tablet; Take 1 Tab by mouth two (2) times a day. 2. COREY on CPAP-managed by sleep medicine. F/u scheduled. 3. Lymphadenopathy-self limiting-monitor          Follow-up Disposition:  Return if symptoms worsen or fail to improve. Disclaimer:  Advised her to call back or return to office if symptoms worsen/change/persist.  Discussed expected course/resolution/complications of diagnosis in detail with patient. Medication risks/benefits/alternatives discussed with patient. She was given an after visit summary which includes diagnoses, current medications, & vitals. Discussed patient instructions and advised to read to all patient instructions regarding care. She expressed understanding with the diagnosis and plan.

## 2018-01-04 ENCOUNTER — OFFICE VISIT (OUTPATIENT)
Dept: NEUROLOGY | Age: 58
End: 2018-01-04

## 2018-01-04 VITALS
SYSTOLIC BLOOD PRESSURE: 110 MMHG | DIASTOLIC BLOOD PRESSURE: 70 MMHG | HEIGHT: 63 IN | WEIGHT: 205 LBS | BODY MASS INDEX: 36.32 KG/M2 | RESPIRATION RATE: 20 BRPM

## 2018-01-04 DIAGNOSIS — M54.2 CERVICALGIA: ICD-10-CM

## 2018-01-04 DIAGNOSIS — G43.009 MIGRAINE WITHOUT AURA AND WITHOUT STATUS MIGRAINOSUS, NOT INTRACTABLE: Primary | ICD-10-CM

## 2018-01-04 DIAGNOSIS — R42 DIZZINESS: ICD-10-CM

## 2018-01-04 RX ORDER — BUPROPION HYDROCHLORIDE 150 MG/1
TABLET ORAL
Refills: 1 | COMMUNITY
Start: 2017-12-26 | End: 2019-08-28 | Stop reason: ALTCHOICE

## 2018-01-04 NOTE — LETTER
Neurology Progress Note Patient ID: Tonie Howard [id number] 
65 y.o. 
1960 HISTORY PROVIDED BY: 
Patient Chief Complaint: Migraines Subjective:  
 Ms. Inocente Turner is here for follow up today of migraines. Since last visit she had a normal MRI of the brain. She has had three migraines since last visit. She was concerned about side effects so she didn't take the Zomig. She had a four day migraine over Flo which finally resolved on its own. She does have a CPAP and can't use this when has migraine. She has only taken fioricet. She will have bloody mucus on one side with migraine. She will take sinus medication and will get a migraine right after. She has avoided these meds due to this. This was a definite trigger. Her [place] migraine was awful. She didn't take any meds. She always has right side heaviness. She is worried about her carotids. She is an only child. Her mom had a mild stroke. 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 She is worried about her risk of stroke. She just started Wellbutrin XL 300mg and has been on this for 30 days. Objective:  
ROS: 
Per HPI- 
Otherwise 12 point ROS was negative Meds: 
Current Outpatient Prescriptions on File Prior to Visit Medication Sig Dispense Refill  estradiol (ESTRACE) 1 mg tablet Take 1 Tab by mouth daily.  nitroglycerin (NITROSTAT) 0.4 mg SL tablet 1 Tab by SubLINGual route every five (5) minutes as needed for Chest Pain for up to 3 doses. 1 Bottle 1  
 PROAIR HFA 90 mcg/actuation inhaler INHALE 1 PUFF BY INHALATION EVERY 6 HOURS AS NEEDED FOR WHEEZING 8.5 Inhaler 0  
 omeprazole (PRILOSEC) 10 mg capsule Take 10 mg by mouth daily.  amoxicillin-clavulanate (AUGMENTIN) 875-125 mg per tablet Take 1 Tab by mouth two (2) times a day.  20 Tab 0  
 ZOLMitriptan (ZOMIG) 5 mg nasal solution 1 Spray by Nasal route once as needed for Migraine for up to 1 dose. 2 Container 0  
 SUMAtriptan (IMITREX) 100 mg tablet Take 1 Tab by mouth once as needed for Migraine for up to 1 dose. 9 Tab 5  Cholecalciferol, Vitamin D3, (VITAMIN D3) 1,000 unit cap Take  by mouth. No current facility-administered medications on file prior to visit. Imaging: MRI brain: neg (I personally reviewed these images in PACS and this is my impression) Reviewed records in [facility] and media tab today Lab Review Results for orders placed or performed in visit on 10/09/17 TSH 3RD GENERATION Result Value Ref Range TSH 1.410 0.450 - 4.500 uIU/mL HEMOGLOBIN A1C WITH EAG Result Value Ref Range Hemoglobin A1c 5.7 (H) 4.8 - 5.6 % Estimated average glucose 117 mg/dL VITAMIN B12 Result Value Ref Range Vitamin B12 534 211 - 946 pg/mL METABOLIC PANEL, COMPREHENSIVE Result Value Ref Range Glucose 86 65 - 99 mg/dL BUN 13 6 - 24 mg/dL Creatinine 0.79 0.57 - 1.00 mg/dL GFR est non-AA 85 >59 mL/min/1.73 GFR est AA 97 >59 mL/min/1.73  
 BUN/Creatinine ratio 16 9 - 23 Sodium 140 134 - 144 mmol/L Potassium 4.3 3.5 - 5.2 mmol/L Chloride 98 96 - 106 mmol/L  
 CO2 25 18 - 29 mmol/L Calcium 10.0 8.7 - 10.2 mg/dL Protein, total 7.9 6.0 - 8.5 g/dL Albumin 4.4 3.5 - 5.5 g/dL GLOBULIN, TOTAL 3.5 1.5 - 4.5 g/dL A-G Ratio 1.3 1.2 - 2.2 Bilirubin, total <0.2 0.0 - 1.2 mg/dL Alk. phosphatase 76 39 - 117 IU/L  
 AST (SGOT) 17 0 - 40 IU/L  
 ALT (SGPT) 16 0 - 32 IU/L  
CBC W/O DIFF Result Value Ref Range WBC 8.0 3.4 - 10.8 x10E3/uL  
 RBC 4.62 3.77 - 5.28 x10E6/uL HGB 13.2 11.1 - 15.9 g/dL HCT 40.0 34.0 - 46.6 % MCV 87 79 - 97 fL  
 MCH 28.6 26.6 - 33.0 pg  
 MCHC 33.0 31.5 - 35.7 g/dL  
 RDW 13.8 12.3 - 15.4 % PLATELET 461 931 - 416 x10E3/uL VITAMIN D, 25 HYDROXY Result Value Ref Range VITAMIN D, 25-HYDROXY 24.8 (L) 30.0 - 100.0 ng/mL HEPATITIS C AB  
 Result Value Ref Range Hep C Virus Ab 0.1 0.0 - 0.9 s/co ratio Exam: 
Visit Vitals  Resp 20  
 Ht 5' 3\" (1.6 m)  Wt 93 kg (205 lb)  BMI 36.31 kg/m2 Gen: Well developed CV: RRR Lungs: non labored breathing Abd: non distending Neuro: A&O x 3, no dysarthria or aphasia CN II-XII: PERRL, EOMI, face symmetric, tongue/palate midline Motor: strength 5/5 all four ext Sensory: intact to LT Gait: normal 
 
Assessment:  
Jessica Celis is a 65 y.o. female who presents for follow up of migraines. MRI brain was negative. She has recently started Wellbutrin for mood, and I think this could be a good headache preventative, so we will continue with this for now. Also discussed trying triptan's for abortive. Advised patient that she is welcome to come and sit in our waiting room prior to taking her first dose if that would make her feel more comfortable. Also, given her family history of stroke, she is concerned about her risk factors, will check carotids. Plan: 1. MRI of the brain normal 
2. Abortive with Zomig nasal spray. If patient likes that she will call for prescription 3. Wellbutrin for preventative. She will also continue vitamin supplements. 4.  Duplex carotids ordered today 5. Encouraged migraine diary 6. Discussed stroke, risk factors, and preventative treatments FU 3 months Signed: 
Doyle Galloway MD 
1/4/2018 This note was created using voice recognition software. Despite editing, there may be syntax errors. This note will not be viewable in [address] Reviewed record in preparation for visit and have necessary documentation Pt did not bring medication to office visit for review Medication list reviewed and reconciled with patient Information was given to pt on Advanced Directives, Living Will 
opportunity was given for questions

## 2018-01-04 NOTE — PATIENT INSTRUCTIONS
Learning About Living Essence  What is a living will? A living will is a legal form you use to write down the kind of care you want at the end of your life. It is used by the health professionals who will treat you if you aren't able to decide for yourself. If you put your wishes in writing, your loved ones and others will know what kind of care you want. They won't need to guess. This can ease your mind and be helpful to others. A living will is not the same as an estate or property will. An estate will explains what you want to happen with your money and property after you die. Is a living will a legal document? A living will is a legal document. Each state has its own laws about living morgan. If you move to another state, make sure that your living will is legal in the state where you now live. Or you might use a universal form that has been approved by many states. This kind of form can sometimes be completed and stored online. Your electronic copy will then be available wherever you have a connection to the Internet. In most cases, doctors will respect your wishes even if you have a form from a different state. · You don't need an  to complete a living will. But legal advice can be helpful if your state's laws are unclear, your health history is complicated, or your family can't agree on what should be in your living will. · You can change your living will at any time. Some people find that their wishes about end-of-life care change as their health changes. · In addition to making a living will, think about completing a medical power of  form. This form lets you name the person you want to make end-of-life treatment decisions for you (your \"health care agent\") if you're not able to. Many hospitals and nursing homes will give you the forms you need to complete a living will and a medical power of .   · Your living will is used only if you can't make or communicate decisions for yourself anymore. If you become able to make decisions again, you can accept or refuse any treatment, no matter what you wrote in your living will. · Your state may offer an online registry. This is a place where you can store your living will online so the doctors and nurses who need to treat you can find it right away. What should you think about when creating a living will? Talk about your end-of-life wishes with your family members and your doctor. Let them know what you want. That way the people making decisions for you won't be surprised by your choices. Think about these questions as you make your living will:  · Do you know enough about life support methods that might be used? If not, talk to your doctor so you know what might be done if you can't breathe on your own, your heart stops, or you're unable to swallow. · What things would you still want to be able to do after you receive life-support methods? Would you want to be able to walk? To speak? To eat on your own? To live without the help of machines? · If you have a choice, where do you want to be cared for? In your home? At a hospital or nursing home? · Do you want certain Sikh practices performed if you become very ill? · If you have a choice at the end of your life, where would you prefer to die? At home? In a hospital or nursing home? Somewhere else? · Would you prefer to be buried or cremated? · Do you want your organs to be donated after you die? What should you do with your living will? · Make sure that your family members and your health care agent have copies of your living will. · Give your doctor a copy of your living will to keep in your medical record. If you have more than one doctor, make sure that each one has a copy. · You may want to put a copy of your living will where it can be easily found. Where can you learn more? Go to http://joe-jaciel.info/.   Enter K659 in the search box to learn more about \"Learning About Living Essence. \"  Current as of: September 24, 2016  Content Version: 11.4  © 9035-1923 [employer]. Care instructions adapted under license by [employer] (which disclaims liability or warranty for this information). If you have questions about a medical condition or this instruction, always ask your healthcare professional. [address] any warranty or liability for your use of this information. Advance Directives: Care Instructions  Your Care Instructions  An advance directive is a legal way to state your wishes at the end of your life. It tells your family and your doctor what to do if you can no longer say what you want. There are two main types of advance directives. You can change them any time that your wishes change. · A living will tells your family and your doctor your wishes about life support and other treatment. · A durable power of  for health care lets you name a person to make treatment decisions for you when you can't speak for yourself. This person is called a health care agent. If you do not have an advance directive, decisions about your medical care may be made by a doctor or a  who doesn't know you. It may help to think of an advance directive as a gift to the people who care for you. If you have one, they won't have to make tough decisions by themselves. Follow-up care is a key part of your treatment and safety. Be sure to make and go to all appointments, and call your doctor if you are having problems. It's also a good idea to know your test results and keep a list of the medicines you take. How can you care for yourself at home? · Discuss your wishes with your loved ones and your doctor. This way, there are no surprises. · Many states have a unique form. Or you might use a universal form that has been approved by many states. This kind of form can sometimes be completed and stored online.  Your electronic copy will then be available wherever you have a connection to the Internet. In most cases, doctors will respect your wishes even if you have a form from a different state. · You don't need a  to do an advance directive. But you may want to get legal advice. · Think about these questions when you prepare an advance directive:  ¨ Who do you want to make decisions about your medical care if you are not able to? Many people choose a family member or close friend. ¨ Do you know enough about life support methods that might be used? If not, talk to your doctor so you understand. ¨ What are you most afraid of that might happen? You might be afraid of having pain, losing your independence, or being kept alive by machines. ¨ Where would you prefer to die? Choices include your home, a hospital, or a nursing home. ¨ Would you like to have information about hospice care to support you and your family? ¨ Do you want to donate organs when you die? ¨ Do you want certain Quaker practices performed before you die? If so, put your wishes in the advance directive. · Read your advance directive every year, and make changes as needed. When should you call for help? Be sure to contact your doctor if you have any questions. Where can you learn more? Go to http://joe-jaciel.info/. Enter R264 in the search box to learn more about \"Advance Directives: Care Instructions. \"  Current as of: September 24, 2016  Content Version: 11.4  © 4127-0724 [employer]. Care instructions adapted under license by [employer] (which disclaims liability or warranty for this information). If you have questions about a medical condition or this instruction, always ask your healthcare professional. [place] [address] any warranty or liability for your use of this information.        Advance Directives: Care Instructions  Your Care Instructions  An advance directive is a legal way to state your wishes at the end of your life. It tells your family and your doctor what to do if you can no longer say what you want. There are two main types of advance directives. You can change them any time that your wishes change. · A living will tells your family and your doctor your wishes about life support and other treatment. · A durable power of  for health care lets you name a person to make treatment decisions for you when you can't speak for yourself. This person is called a health care agent. If you do not have an advance directive, decisions about your medical care may be made by a doctor or a  who doesn't know you. It may help to think of an advance directive as a gift to the people who care for you. If you have one, they won't have to make tough decisions by themselves. Follow-up care is a key part of your treatment and safety. Be sure to make and go to all appointments, and call your doctor if you are having problems. It's also a good idea to know your test results and keep a list of the medicines you take. How can you care for yourself at home? · Discuss your wishes with your loved ones and your doctor. This way, there are no surprises. · Many states have a unique form. Or you might use a universal form that has been approved by many states. This kind of form can sometimes be completed and stored online. Your electronic copy will then be available wherever you have a connection to the Internet. In most cases, doctors will respect your wishes even if you have a form from a different state. · You don't need a  to do an advance directive. But you may want to get legal advice. · Think about these questions when you prepare an advance directive:  ¨ Who do you want to make decisions about your medical care if you are not able to? Many people choose a family member or close friend. ¨ Do you know enough about life support methods that might be used?  If not, talk to your doctor so you understand. ¨ What are you most afraid of that might happen? You might be afraid of having pain, losing your independence, or being kept alive by machines. ¨ Where would you prefer to die? Choices include your home, a hospital, or a nursing home. ¨ Would you like to have information about hospice care to support you and your family? ¨ Do you want to donate organs when you die? ¨ Do you want certain Roman Catholic practices performed before you die? If so, put your wishes in the advance directive. · Read your advance directive every year, and make changes as needed. When should you call for help? Be sure to contact your doctor if you have any questions. Where can you learn more? Go to http://joe-jaciel.info/. Enter R264 in the search box to learn more about \"Advance Directives: Care Instructions. \"  Current as of: September 24, 2016  Content Version: 11.4  © 4549-8253 [employer]. Care instructions adapted under license by [employer] (which disclaims liability or warranty for this information). If you have questions about a medical condition or this instruction, always ask your healthcare professional. [place] [address] any warranty or liability for your use of this information.

## 2018-01-04 NOTE — MR AVS SNAPSHOT
Visit Information Date & Time Provider Department Dept. Phone Encounter #  
 1/4/2018  2:40 PM Narda Watson MD [facility] Neurology [facility] [phone] [id number] Upcoming Health Maintenance Date Due Pneumococcal 19-64 Medium Risk (1 of 1 - PPSV23) 12/1/2020* PAP AKA CERVICAL CYTOLOGY 10/14/2018 BREAST CANCER SCRN MAMMOGRAM 4/26/2019 COLONOSCOPY 7/28/2022 DTaP/Tdap/Td series (2 - Td) 9/10/2023 *Topic was postponed. The date shown is not the original due date. Allergies as of 1/4/2018  Review Complete On: 1/4/2018 By: Narda Watson MD  
  
 Severity Noted Reaction Type Reactions Tylenol-codeine #3 [Acetaminophen-codeine] High 07/05/2013   Side Effect Other (comments) Vomitting, and upset stomach state that she is not allergic Iodinated Contrast- Oral And Iv Dye  09/04/2015   Systemic Hives Current Immunizations  Reviewed on 10/9/2017 Name Date Influenza Vaccine (Quad) PF 10/9/2017 Influenza Vaccine Intradermal PF 11/13/2015 Not reviewed this visit You Were Diagnosed With   
  
 Codes Comments Migraine without aura and without status migrainosus, not intractable    -  Primary ICD-10-CM: G43.009 ICD-9-CM: 346.10 Cervicalgia     ICD-10-CM: M54.2 ICD-9-CM: 723.1 Dizziness     ICD-10-CM: N68 ICD-9-CM: 780.4 Vitals BP Resp Height(growth percentile) Weight(growth percentile) BMI OB Status 110/70 20 5' 3\" (1.6 m) 205 lb (93 kg) 36.31 kg/m2 Ablation Smoking Status Never Smoker Vitals History BMI and BSA Data Body Mass Index Body Surface Area  
 36.31 kg/m 2 2.03 m 2 Preferred Pharmacy Pharmacy Name Phone [id number] [place], [facility] [address] [phone] Your Updated Medication List  
  
   
This list is accurate as of: 1/4/18  3:29 PM.  Always use your most recent med list.  
  
  
  
 amoxicillin-clavulanate 875-125 mg per tablet Commonly known as:  AUGMENTIN Take 1 Tab by mouth two (2) times a day. buPROPion  mg tablet Commonly known as:  WELLBUTRIN XL  
TAKE 1 TABLET BY MOUTH EVERY MORNING  
  
 estradiol 1 mg tablet Commonly known as:  ESTRACE Take 1 Tab by mouth daily. nitroglycerin 0.4 mg SL tablet Commonly known as:  NITROSTAT  
1 Tab by SubLINGual route every five (5) minutes as needed for Chest Pain for up to 3 doses. omeprazole 10 mg capsule Commonly known as:  PRILOSEC Take 10 mg by mouth daily. PROAIR HFA 90 mcg/actuation inhaler Generic drug:  albuterol INHALE 1 PUFF BY INHALATION EVERY 6 HOURS AS NEEDED FOR WHEEZING  
  
 SUMAtriptan 100 mg tablet Commonly known as:  IMITREX Take 1 Tab by mouth once as needed for Migraine for up to 1 dose. VITAMIN D3 1,000 unit Cap Generic drug:  cholecalciferol Take  by mouth. ZOLMitriptan 5 mg nasal solution Commonly known as:  ZOMIG  
1 Spray by Nasal route once as needed for Migraine for up to 1 dose. To-Do List   
 01/05/2018 Imaging:  DUPLEX CAROTID BILATERAL AMB NEURO Patient Instructions Angela Vargas 1727 What is a living will? A living will is a legal form you use to write down the kind of care you want at the end of your life. It is used by the health professionals who will treat you if you aren't able to decide for yourself. If you put your wishes in writing, your loved ones and others will know what kind of care you want. They won't need to guess. This can ease your mind and be helpful to others. A living will is not the same as an estate or property will. An estate will explains what you want to happen with your money and property after you die. Is a living will a legal document? A living will is a legal document.  Each state has its own laws about living morgan. If you move to another state, make sure that your living will is legal in the state where you now live. Or you might use a universal form that has been approved by many states. This kind of form can sometimes be completed and stored online. Your electronic copy will then be available wherever you have a connection to the Internet. In most cases, doctors will respect your wishes even if you have a form from a different state. · You don't need an  to complete a living will. But legal advice can be helpful if your state's laws are unclear, your health history is complicated, or your family can't agree on what should be in your living will. · You can change your living will at any time. Some people find that their wishes about end-of-life care change as their health changes. · In addition to making a living will, think about completing a medical power of  form. This form lets you name the person you want to make end-of-life treatment decisions for you (your \"health care agent\") if you're not able to. Many hospitals and nursing homes will give you the forms you need to complete a living will and a medical power of . · Your living will is used only if you can't make or communicate decisions for yourself anymore. If you become able to make decisions again, you can accept or refuse any treatment, no matter what you wrote in your living will. · Your state may offer an online registry. This is a place where you can store your living will online so the doctors and nurses who need to treat you can find it right away. What should you think about when creating a living will? Talk about your end-of-life wishes with your family members and your doctor. Let them know what you want. That way the people making decisions for you won't be surprised by your choices. Think about these questions as you make your living will: · Do you know enough about life support methods that might be used? If not, talk to your doctor so you know what might be done if you can't breathe on your own, your heart stops, or you're unable to swallow. · What things would you still want to be able to do after you receive life-support methods? Would you want to be able to walk? To speak? To eat on your own? To live without the help of machines? · If you have a choice, where do you want to be cared for? In your home? At a hospital or nursing home? · Do you want certain Gnosticist practices performed if you become very ill? · If you have a choice at the end of your life, where would you prefer to die? At home? In a hospital or nursing home? Somewhere else? · Would you prefer to be buried or cremated? · Do you want your organs to be donated after you die? What should you do with your living will? · Make sure that your family members and your health care agent have copies of your living will. · Give your doctor a copy of your living will to keep in your medical record. If you have more than one doctor, make sure that each one has a copy. · You may want to put a copy of your living will where it can be easily found. Where can you learn more? Go to http://joe-jaciel.info/. Enter K248 in the search box to learn more about \"Learning About Living Essence. \" Current as of: September 24, 2016 Content Version: 11.4 © 7301-9033 [employer]. Care instructions adapted under license by [employer] (which disclaims liability or warranty for this information). If you have questions about a medical condition or this instruction, always ask your healthcare professional. [place] [address] any warranty or liability for your use of this information. Advance Directives: Care Instructions Your Care Instructions An advance directive is a legal way to state your wishes at the end of your life. It tells your family and your doctor what to do if you can no longer say what you want. There are two main types of advance directives. You can change them any time that your wishes change. · A living will tells your family and your doctor your wishes about life support and other treatment. · A durable power of  for health care lets you name a person to make treatment decisions for you when you can't speak for yourself. This person is called a health care agent. If you do not have an advance directive, decisions about your medical care may be made by a doctor or a  who doesn't know you. It may help to think of an advance directive as a gift to the people who care for you. If you have one, they won't have to make tough decisions by themselves. Follow-up care is a key part of your treatment and safety. Be sure to make and go to all appointments, and call your doctor if you are having problems. It's also a good idea to know your test results and keep a list of the medicines you take. How can you care for yourself at home? · Discuss your wishes with your loved ones and your doctor. This way, there are no surprises. · Many states have a unique form. Or you might use a universal form that has been approved by many states. This kind of form can sometimes be completed and stored online. Your electronic copy will then be available wherever you have a connection to the Internet. In most cases, doctors will respect your wishes even if you have a form from a different state. · You don't need a  to do an advance directive. But you may want to get legal advice. · Think about these questions when you prepare an advance directive: ¨ Who do you want to make decisions about your medical care if you are not able to? Many people choose a family member or close friend. ¨ Do you know enough about life support methods that might be used? If not, talk to your doctor so you understand. ¨ What are you most afraid of that might happen? You might be afraid of having pain, losing your independence, or being kept alive by machines. ¨ Where would you prefer to die? Choices include your home, a hospital, or a nursing home. ¨ Would you like to have information about hospice care to support you and your family? ¨ Do you want to donate organs when you die? ¨ Do you want certain Sabianism practices performed before you die? If so, put your wishes in the advance directive. · Read your advance directive every year, and make changes as needed. When should you call for help? Be sure to contact your doctor if you have any questions. Where can you learn more? Go to http://joe-jaciel.info/. Enter R264 in the search box to learn more about \"Advance Directives: Care Instructions. \" Current as of: September 24, 2016 Content Version: 11.4 © 4792-0280 [employer]. Care instructions adapted under license by [employer] (which disclaims liability or warranty for this information). If you have questions about a medical condition or this instruction, always ask your healthcare professional. [place] [address] any warranty or liability for your use of this information. Advance Directives: Care Instructions Your Care Instructions An advance directive is a legal way to state your wishes at the end of your life. It tells your family and your doctor what to do if you can no longer say what you want. There are two main types of advance directives. You can change them any time that your wishes change. · A living will tells your family and your doctor your wishes about life support and other treatment. · A durable power of  for health care lets you name a person to make treatment decisions for you when you can't speak for yourself. This person is called a health care agent. If you do not have an advance directive, decisions about your medical care may be made by a doctor or a  who doesn't know you. It may help to think of an advance directive as a gift to the people who care for you. If you have one, they won't have to make tough decisions by themselves. Follow-up care is a key part of your treatment and safety. Be sure to make and go to all appointments, and call your doctor if you are having problems. It's also a good idea to know your test results and keep a list of the medicines you take. How can you care for yourself at home? · Discuss your wishes with your loved ones and your doctor. This way, there are no surprises. · Many states have a unique form. Or you might use a universal form that has been approved by many states. This kind of form can sometimes be completed and stored online. Your electronic copy will then be available wherever you have a connection to the Internet. In most cases, doctors will respect your wishes even if you have a form from a different state. · You don't need a  to do an advance directive. But you may want to get legal advice. · Think about these questions when you prepare an advance directive: ¨ Who do you want to make decisions about your medical care if you are not able to? Many people choose a family member or close friend. ¨ Do you know enough about life support methods that might be used? If not, talk to your doctor so you understand. ¨ What are you most afraid of that might happen? You might be afraid of having pain, losing your independence, or being kept alive by machines. ¨ Where would you prefer to die? Choices include your home, a hospital, or a nursing home. ¨ Would you like to have information about hospice care to support you and your family? ¨ Do you want to donate organs when you die? ¨ Do you want certain Nondenominational practices performed before you die? If so, put your wishes in the advance directive. · Read your advance directive every year, and make changes as needed. When should you call for help? Be sure to contact your doctor if you have any questions. Where can you learn more? Go to http://joe-jaciel.info/. Enter R264 in the search box to learn more about \"Advance Directives: Care Instructions. \" Current as of: September 24, 2016 Content Version: 11.4 © 6186-3820 [employer]. Care instructions adapted under license by [employer] (which disclaims liability or warranty for this information). If you have questions about a medical condition or this instruction, always ask your healthcare professional. [address] any warranty or liability for your use of this information. Introducing [facility]! [facility] introduces YouFetch patient portal. Now you can access parts of your medical record, email your doctor's office, and request medication refills online. 1. In your internet browser, go to [email or web address] 2. Click on the First Time User? Click Here link in the Sign In box. You will see the New Member Sign Up page. 3. Enter your YouFetch Access Code exactly as it appears below. You will not need to use this code after youve completed the sign-up process. If you do not sign up before the expiration date, you must request a new code. · YouFetch Access Code: [email or web address] Expires: 4/4/2018  3:28 PM 
 
4. Enter the last four digits of your Social Security Number (xxxx) and Date of Birth (mm/dd/yyyy) as indicated and click Submit. You will be taken to the next sign-up page. 5. Create a Intalet ID. This will be your YouFetch login ID and cannot be changed, so think of one that is secure and easy to remember. 6. Create a YouFetch password. You can change your password at any time. 7. Enter your Password Reset Question and Answer. This can be used at a later time if you forget your password. 8. Enter your e-mail address. You will receive e-mail notification when new information is available in [address] 9. Click Sign Up. You can now view and download portions of your medical record. 10. Click the Download Summary menu link to download a portable copy of your medical information. If you have questions, please visit the Frequently Asked Questions section of the [facility] website. Remember, [facility] is NOT to be used for urgent needs. For medical emergencies, dial 911. Now available from your iPhone and Android! Please provide this summary of care documentation to your next provider. Your primary care clinician is listed as Rowe Crigler. If you have any questions after today's visit, please call [phone].

## 2018-01-04 NOTE — PROGRESS NOTES
Neurology Progress Note    Patient ID:  Ashley Vides  [id number]  5 y.o.  1960    HISTORY PROVIDED BY:  Patient      Chief Complaint: Migraines  Subjective:    Ms. Marifer Jaramillo is here for follow up today of migraines. Since last visit she had a normal MRI of the brain. She has had three migraines since last visit. She was concerned about side effects so she didn't take the Zomig. She had a four day migraine over [place] which finally resolved on its own. She does have a CPAP and can't use this when has migraine. She has only taken fioricet. She will have bloody mucus on one side with migraine. She will take sinus medication and will get a migraine right after. She has avoided these meds due to this. This was a definite trigger. Her [place] migraine was awful. She didn't take any meds. She always has right side heaviness. She is worried about her carotids. She is an only child. Her mom had a mild stroke. 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 She is worried about her risk of stroke. She just started Wellbutrin XL 300mg and has been on this for 30 days. Objective:   ROS:  Per HPI-  Otherwise 12 point ROS was negative    Meds:  Current Outpatient Prescriptions on File Prior to Visit   Medication Sig Dispense Refill    estradiol (ESTRACE) 1 mg tablet Take 1 Tab by mouth daily.  nitroglycerin (NITROSTAT) 0.4 mg SL tablet 1 Tab by SubLINGual route every five (5) minutes as needed for Chest Pain for up to 3 doses. 1 Bottle 1    PROAIR HFA 90 mcg/actuation inhaler INHALE 1 PUFF BY INHALATION EVERY 6 HOURS AS NEEDED FOR WHEEZING 8.5 Inhaler 0    omeprazole (PRILOSEC) 10 mg capsule Take 10 mg by mouth daily.  amoxicillin-clavulanate (AUGMENTIN) 875-125 mg per tablet Take 1 Tab by mouth two (2) times a day. 20 Tab 0    ZOLMitriptan (ZOMIG) 5 mg nasal solution 1 Spray by Nasal route once as needed for Migraine for up to 1 dose.  2 Container 0    SUMAtriptan (IMITREX) 100 mg tablet Take 1 Tab by mouth once as needed for Migraine for up to 1 dose. 9 Tab 5    Cholecalciferol, Vitamin D3, (VITAMIN D3) 1,000 unit cap Take  by mouth. No current facility-administered medications on file prior to visit. Imaging:  MRI brain: neg (I personally reviewed these images in PACS and this is my impression)      Reviewed records in [facility] and media tab today    Lab Review   Results for orders placed or performed in visit on 10/09/17   TSH 3RD GENERATION   Result Value Ref Range    TSH 1.410 0.450 - 4.500 uIU/mL   HEMOGLOBIN A1C WITH EAG   Result Value Ref Range    Hemoglobin A1c 5.7 (H) 4.8 - 5.6 %    Estimated average glucose 117 mg/dL   VITAMIN B12   Result Value Ref Range    Vitamin B12 534 211 - 600 pg/mL   METABOLIC PANEL, COMPREHENSIVE   Result Value Ref Range    Glucose 86 65 - 99 mg/dL    BUN 13 6 - 24 mg/dL    Creatinine 0.79 0.57 - 1.00 mg/dL    GFR est non-AA 85 >59 mL/min/1.73    GFR est AA 97 >59 mL/min/1.73    BUN/Creatinine ratio 16 9 - 23    Sodium 140 134 - 144 mmol/L    Potassium 4.3 3.5 - 5.2 mmol/L    Chloride 98 96 - 106 mmol/L    CO2 25 18 - 29 mmol/L    Calcium 10.0 8.7 - 10.2 mg/dL    Protein, total 7.9 6.0 - 8.5 g/dL    Albumin 4.4 3.5 - 5.5 g/dL    GLOBULIN, TOTAL 3.5 1.5 - 4.5 g/dL    A-G Ratio 1.3 1.2 - 2.2    Bilirubin, total <0.2 0.0 - 1.2 mg/dL    Alk.  phosphatase 76 39 - 117 IU/L    AST (SGOT) 17 0 - 40 IU/L    ALT (SGPT) 16 0 - 32 IU/L   CBC W/O DIFF   Result Value Ref Range    WBC 8.0 3.4 - 10.8 x10E3/uL    RBC 4.62 3.77 - 5.28 x10E6/uL    HGB 13.2 11.1 - 15.9 g/dL    HCT 40.0 34.0 - 46.6 %    MCV 87 79 - 97 fL    MCH 28.6 26.6 - 33.0 pg    MCHC 33.0 31.5 - 35.7 g/dL    RDW 13.8 12.3 - 15.4 %    PLATELET 643 152 - 923 x10E3/uL   VITAMIN D, 25 HYDROXY   Result Value Ref Range    VITAMIN D, 25-HYDROXY 24.8 (L) 30.0 - 100.0 ng/mL   HEPATITIS C AB   Result Value Ref Range    Hep C Virus Ab 0.1 0.0 - 0.9 s/co ratio Exam:  Visit Vitals    Resp 20    Ht 5' 3\" (1.6 m)    Wt 93 kg (205 lb)    BMI 36.31 kg/m2     Gen: Well developed  CV: RRR  Lungs: non labored breathing  Abd: non distending  Neuro: A&O x 3, no dysarthria or aphasia  CN II-XII: PERRL, EOMI, face symmetric, tongue/palate midline  Motor: strength 5/5 all four ext  Sensory: intact to LT  Gait: normal    Assessment:   Amanda Hunt is a 65 y.o. female who presents for follow up of migraines. MRI brain was negative. She has recently started Wellbutrin for mood, and I think this could be a good headache preventative, so we will continue with this for now. Also discussed trying triptan's for abortive. Advised patient that she is welcome to come and sit in our waiting room prior to taking her first dose if that would make her feel more comfortable. Also, given her family history of stroke, she is concerned about her risk factors, will check carotids. Plan:     1. MRI of the brain normal  2. Abortive with Zomig nasal spray. If patient likes that she will call for prescription  3. Wellbutrin for preventative. She will also continue vitamin supplements. 4.  Duplex carotids ordered today  5. Encouraged migraine diary  6. Discussed stroke, risk factors, and preventative treatments    FU 3 months    Signed:  Ruddy Luo MD  1/4/2018    This note was created using voice recognition software. Despite editing, there may be syntax errors. This note will not be viewable in [address]

## 2018-01-19 ENCOUNTER — OFFICE VISIT (OUTPATIENT)
Dept: NEUROLOGY | Age: 58
End: 2018-01-19

## 2018-01-19 DIAGNOSIS — G43.009 MIGRAINE WITHOUT AURA AND WITHOUT STATUS MIGRAINOSUS, NOT INTRACTABLE: ICD-10-CM

## 2018-01-19 DIAGNOSIS — R42 DIZZINESS: Primary | ICD-10-CM

## 2018-01-22 NOTE — PROCEDURES
Carotid Doppler:      Date:  01/19/18    Requesting Physician:  Daryle Sep, MD     Indication:  Dizziness. B-mode imaging reveals no significant plaque throughout the carotid systems bilaterally. Doppler spectral analysis reveals no elevated velocities. Vertebral artery flow antegrade bilaterally. Interpretation:  Normal study.

## 2018-01-31 DIAGNOSIS — J45.20 ALLERGY-INDUCED ASTHMA, MILD INTERMITTENT, UNCOMPLICATED: ICD-10-CM

## 2018-01-31 DIAGNOSIS — R07.89 CHEST TIGHTNESS: ICD-10-CM

## 2018-01-31 RX ORDER — ALBUTEROL SULFATE 90 UG/1
AEROSOL, METERED RESPIRATORY (INHALATION)
Qty: 8.5 INHALER | Refills: 0 | Status: SHIPPED | OUTPATIENT
Start: 2018-01-31 | End: 2018-05-09 | Stop reason: SDUPTHER

## 2018-05-09 DIAGNOSIS — J45.20 ALLERGY-INDUCED ASTHMA, MILD INTERMITTENT, UNCOMPLICATED: ICD-10-CM

## 2018-05-09 DIAGNOSIS — R07.89 CHEST TIGHTNESS: ICD-10-CM

## 2018-05-09 RX ORDER — ALBUTEROL SULFATE 90 UG/1
AEROSOL, METERED RESPIRATORY (INHALATION)
Qty: 8.5 INHALER | Refills: 0 | Status: SHIPPED | OUTPATIENT
Start: 2018-05-09 | End: 2019-12-07 | Stop reason: SDUPTHER

## 2018-06-27 ENCOUNTER — OFFICE VISIT (OUTPATIENT)
Dept: INTERNAL MEDICINE CLINIC | Age: 58
End: 2018-06-27

## 2018-06-27 VITALS
HEART RATE: 98 BPM | WEIGHT: 199.6 LBS | DIASTOLIC BLOOD PRESSURE: 72 MMHG | HEIGHT: 63 IN | BODY MASS INDEX: 35.37 KG/M2 | OXYGEN SATURATION: 99 % | TEMPERATURE: 98.6 F | SYSTOLIC BLOOD PRESSURE: 118 MMHG | RESPIRATION RATE: 12 BRPM

## 2018-06-27 DIAGNOSIS — J20.9 BRONCHITIS, ACUTE, WITH BRONCHOSPASM: Primary | ICD-10-CM

## 2018-06-27 DIAGNOSIS — R05.9 COUGH: ICD-10-CM

## 2018-06-27 PROBLEM — E66.01 SEVERE OBESITY (BMI 35.0-39.9): Status: ACTIVE | Noted: 2018-06-27

## 2018-06-27 RX ORDER — METHYLPREDNISOLONE 4 MG/1
TABLET ORAL
Qty: 1 DOSE PACK | Refills: 0 | Status: SHIPPED | OUTPATIENT
Start: 2018-06-27 | End: 2018-06-29 | Stop reason: SDUPTHER

## 2018-06-27 RX ORDER — AZITHROMYCIN 250 MG/1
TABLET, FILM COATED ORAL
Qty: 6 TAB | Refills: 0 | Status: SHIPPED | OUTPATIENT
Start: 2018-06-27 | End: 2018-07-02

## 2018-06-27 RX ORDER — BENZONATATE 200 MG/1
200 CAPSULE ORAL
Qty: 21 CAP | Refills: 0 | Status: SHIPPED | OUTPATIENT
Start: 2018-06-27 | End: 2018-07-04

## 2018-06-27 RX ORDER — VORTIOXETINE 5 MG/1
TABLET, FILM COATED ORAL
Refills: 2 | COMMUNITY
Start: 2018-04-27 | End: 2019-08-28 | Stop reason: ALTCHOICE

## 2018-06-27 NOTE — MR AVS SNAPSHOT
[address] [facility] [address] 
[phone] Patient: Tabitha Mayo MRN: [id number] Visit Information Date & Time Provider Department Dept. Phone Encounter #  
 2018 12:00 PM Yunior Driver, [address] [facility],[address] Internal Medicine [phone] [id number] Upcoming Health Maintenance Date Due  
 PAP AKA CERVICAL CYTOLOGY 10/14/2018 Pneumococcal 19-64 Medium Risk (1 of 1 - PPSV23) 2020* Influenza Age 5 to Adult 2018 BREAST CANCER SCRN MAMMOGRAM 2019 COLONOSCOPY 2022 DTaP/Tdap/Td series (2 - Td) 9/10/2023 *Topic was postponed. The date shown is not the original due date. Allergies as of 2018  Review Complete On: 2018 By: Yunior Driver MD  
  
 Severity Noted Reaction Type Reactions Tylenol-codeine #3 [Acetaminophen-codeine] High 2013   Side Effect Other (comments) Vomitting, and upset stomach state that she is not allergic Iodinated Contrast- Oral And Iv Dye  2015   Systemic Hives Current Immunizations  Reviewed on 10/9/2017 Name Date Influenza Vaccine (Quad) PF 10/9/2017 Influenza Vaccine Intradermal PF 2015 Not reviewed this visit You Were Diagnosed With   
  
 Codes Comments Bronchitis, acute, with bronchospasm    -  Primary ICD-10-CM: J20.9 ICD-9-CM: 466.0 Cough     ICD-10-CM: R05 ICD-9-CM: 639. 2 Vitals BP Pulse Temp Resp Height(growth percentile) Weight(growth percentile) 118/72 (BP 1 Location: Left arm, BP Patient Position: Sitting) 98 98.6 °F (37 °C) (Oral) 12 5' 3\" (1.6 m) 199 lb 9.6 oz (90.5 kg) SpO2 BMI OB Status Smoking Status 99% 35.36 kg/m2 Ablation Never Smoker Vitals History BMI and BSA Data Body Mass Index Body Surface Area  
 35.36 kg/m 2 2.01 m 2 Preferred Pharmacy Pharmacy Name Phone [facility] #[id number]- [address] [facility] [address] [phone] Your Updated Medication List  
  
   
This list is accurate as of 6/27/18  1:08 PM.  Always use your most recent med list.  
  
  
  
  
 azithromycin 250 mg tablet Commonly known as:  ZITHROMAX  
use as directed  
  
 benzonatate 200 mg capsule Commonly known as:  TESSALON Take 1 Cap by mouth three (3) times daily as needed for Cough for up to 7 days. buPROPion  mg tablet Commonly known as:  WELLBUTRIN XL  
TAKE 1 TABLET BY MOUTH EVERY MORNING  
  
 estradiol 1 mg tablet Commonly known as:  ESTRACE Take 1 Tab by mouth daily. methylPREDNISolone 4 mg tablet Commonly known as:  Jodeane Hove As directed. nitroglycerin 0.4 mg SL tablet Commonly known as:  NITROSTAT  
1 Tab by SubLINGual route every five (5) minutes as needed for Chest Pain for up to 3 doses. omeprazole 10 mg capsule Commonly known as:  PRILOSEC Take 10 mg by mouth daily. PROAIR HFA 90 mcg/actuation inhaler Generic drug:  albuterol INHALE 1 PUFF BY INHALATION EVERY 6 HOURS AS NEEDED FOR WHEEZING  
  
 SUMAtriptan 100 mg tablet Commonly known as:  IMITREX Take 1 Tab by mouth once as needed for Migraine for up to 1 dose. TRINTELLIX 5 mg tablet Generic drug:  vortioxetine TAKE 1 TABLET BY MOUTH EVERY DAY  
  
 VITAMIN D3 1,000 unit Cap Generic drug:  cholecalciferol Take  by mouth. Prescriptions Sent to Pharmacy Refills  
 azithromycin (ZITHROMAX) 250 mg tablet 0 Sig: use as directed Class: Normal  
 Pharmacy: [facility]/pharmacy #[id number] [place], [facility] [address] Ph #: [phone]  
 methylPREDNISolone (MEDROL DOSEPACK) 4 mg tablet 0 Sig: As directed.   
 Class: Normal  
 Pharmacy: [facility]/pharmacy #[id number] [facility] [address] Ph #: [phone]  
 benzonatate (TESSALON) 200 mg capsule 0 Sig: Take 1 Cap by mouth three (3) times daily as needed for Cough for up to 7 days. Class: Normal  
 Pharmacy: [facility] #: [phone] Route: Oral  
  
Introducing [facility] SERVICES! [employer] introduces [employer] patient portal. Now you can access parts of your medical record, email your doctor's office, and request medication refills online. 1. In your internet browser, go to [email or web address] 2. Click on the First Time User? Click Here link in the Sign In box. You will see the New Member Sign Up page. 3. Enter your [employer] Access Code exactly as it appears below. You will not need to use this code after youve completed the sign-up process. If you do not sign up before the expiration date, you must request a new code. · [employer] Access Code: [email or web address] Expires: 9/25/2018  1:08 PM 
 
4. Enter the last four digits of your Social Security Number (xxxx) and Date of Birth (mm/dd/yyyy) as indicated and click Submit. You will be taken to the next sign-up page. 5. Create a [employer] ID. This will be your [employer] login ID and cannot be changed, so think of one that is secure and easy to remember. 6. Create a [employer] password. You can change your password at any time. 7. Enter your Password Reset Question and Answer. This can be used at a later time if you forget your password. 8. Enter your e-mail address. You will receive e-mail notification when new information is available in [address] 9. Click Sign Up. You can now view and download portions of your medical record. 10. Click the Download Summary menu link to download a portable copy of your medical information.  
 
If you have questions, please visit the Frequently Asked Questions section of the [facility]. Remember, [employer] is NOT to be used for urgent needs. For medical emergencies, dial 911. Now available from your iPhone and Android! Please provide this summary of care documentation to your next provider. Your primary care clinician is listed as Chencho Ha. If you have any questions after today's visit, please call [phone].

## 2018-06-27 NOTE — PROGRESS NOTES
1. Have you been to the ER, urgent care clinic since your last visit? Hospitalized since your last visit? No    2. Have you seen or consulted any other health care providers outside of the [facility] since your last visit? Include any pap smears or colon screening. No     Chief Complaint   Patient presents with    Cough     Pt states coughing x5 days. Pt states sinus pain, sharp mostly on right side of face and excessive sweating. Pt states treament with advil and proair inhaler with no relief.  Ear Pain     Pt states right ear pain x5 days. Pt denies any drainage.

## 2018-06-27 NOTE — PROGRESS NOTES
Written by Elmo Doty, as dictated by Dr. Julissa Lawson MD.    Akosua Dial is a 65 y.o. female. HPI   The patient presents today c/o cough and ear pain, which started on the evening of 06/22. She states that the pain starts in her ears and goes into her throat. She states that the pain is worse on the L side. She is also having body aches, for which she has been taking OTC medication. She has also experienced some sweating. Her son was admitted to the hospital for pneumonia and she believes that cough is from him. She weighs 199 lbs today, down from 205 lbs in 01/2018. She has been seeing Dr. Galdino Torres for her migraines. She was given a nasal spray, but she has not used it. She is working part time at [employer]. Patient Active Problem List   Diagnosis Code    Hyperglycemia R73.9    Herniated disc [id number]        Current Outpatient Prescriptions on File Prior to Visit   Medication Sig Dispense Refill    PROAIR HFA 90 mcg/actuation inhaler INHALE 1 PUFF BY INHALATION EVERY 6 HOURS AS NEEDED FOR WHEEZING 8.5 Inhaler 0    buPROPion XL (WELLBUTRIN XL) 150 mg tablet TAKE 1 TABLET BY MOUTH EVERY MORNING  1    nitroglycerin (NITROSTAT) 0.4 mg SL tablet 1 Tab by SubLINGual route every five (5) minutes as needed for Chest Pain for up to 3 doses. 1 Bottle 1    omeprazole (PRILOSEC) 10 mg capsule Take 10 mg by mouth daily.  SUMAtriptan (IMITREX) 100 mg tablet Take 1 Tab by mouth once as needed for Migraine for up to 1 dose. 9 Tab 5    estradiol (ESTRACE) 1 mg tablet Take 1 Tab by mouth daily.  Cholecalciferol, Vitamin D3, (VITAMIN D3) 1,000 unit cap Take  by mouth. No current facility-administered medications on file prior to visit.         Allergies   Allergen Reactions    Tylenol-Codeine #3 [Acetaminophen-Codeine] Other (comments)     Vomitting, and upset stomach state that she is not allergic    Iodinated Contrast- Oral And Iv Dye Hives Past Medical History:   Diagnosis Date    Asthma     GERD (gastroesophageal reflux disease)     Headache     Nutcracker esophagus 2011       Past Surgical History:   Procedure Laterality Date    ABDOMEN SURGERY PROC UNLISTED      ENDOSCOPY, COLON, DIAGNOSTIC      HX GYN      HX HEENT      HX LUMBAR DISKECTOMY  9/5/2013       Family History   Problem Relation Age of Onset    Cancer Mother     Heart Disease Father     Breast Cancer Maternal Grandmother     Breast Cancer Paternal Grandmother     Breast Cancer Maternal Aunt     Breast Cancer Paternal Aunt        Social History     Social History    Marital status:      Spouse name: N/A    Number of children: N/A    Years of education: N/A     Occupational History    Not on file. Social History Main Topics    Smoking status: Never Smoker    Smokeless tobacco: Never Used    Alcohol use Yes      Comment: occasional    Drug use: No    Sexual activity: Yes     Partners: Male     Other Topics Concern    Not on file     Social History Narrative       Review of Systems   Constitutional: Positive for diaphoresis. Negative for malaise/fatigue. HENT: Positive for congestion, ear pain and sore throat. Respiratory: Positive for cough. Negative for shortness of breath. Musculoskeletal: Positive for myalgias. Negative for joint pain. Neurological: Negative for weakness. Psychiatric/Behavioral: Negative for memory loss. Visit Vitals    /72 (BP 1 Location: Left arm, BP Patient Position: Sitting)    Pulse 98    Temp 98.6 °F (37 °C) (Oral)    Resp 12    Ht 5' 3\" (1.6 m)    Wt 199 lb 9.6 oz (90.5 kg)    SpO2 99%    BMI 35.36 kg/m2       Physical Exam   Constitutional: She is oriented to person, place, and time. She appears well-developed. No distress.    Obese   HENT:   Right Ear: External ear normal.   Left Ear: External ear normal.   R ear fluid behind tympanic membrane    Eyes: Conjunctivae and EOM are normal. Right eye exhibits no discharge. Left eye exhibits no discharge. Neck: Normal range of motion. Neck supple. R lymph node enlarged and painful    Cardiovascular: Normal rate and regular rhythm. Pulmonary/Chest: Effort normal. She has no wheezes. Decreased breath sounds and rhonchii    Abdominal: Soft. Bowel sounds are normal. There is no tenderness. Lymphadenopathy:     She has cervical adenopathy. Neurological: She is alert and oriented to person, place, and time. Skin: She is not diaphoretic. Psychiatric: She has a normal mood and affect. Her behavior is normal.   Nursing note and vitals reviewed. ASSESSMENT and PLAN    ICD-10-CM ICD-9-CM    1. Bronchitis, acute, with bronchospasm J20.9 466.0 azithromycin (ZITHROMAX) 250 mg tablet sent to pharmacy. methylPREDNISolone (MEDROL DOSEPACK) 4 mg tablet sent to pharmacy. Azithromycin 250 mg and Medrol dosepack prescribed for her infection and bronchospasm. 2. Cough R05 786.2 benzonatate (TESSALON) 200 mg capsule sent to pharmacy. Tessalon 200 mg prescribed for her cough. This plan was reviewed with the patient and patient agrees. All questions were answered. This scribe documentation was reviewed by me and accurately reflects the examination and decisions made by me. This note will not be viewable in [address]

## 2018-06-29 ENCOUNTER — TELEPHONE (OUTPATIENT)
Dept: FAMILY MEDICINE CLINIC | Age: 58
End: 2018-06-29

## 2018-06-29 DIAGNOSIS — J45.20 ALLERGY-INDUCED ASTHMA, MILD INTERMITTENT, UNCOMPLICATED: ICD-10-CM

## 2018-06-29 DIAGNOSIS — J20.9 BRONCHITIS, ACUTE, WITH BRONCHOSPASM: ICD-10-CM

## 2018-06-29 DIAGNOSIS — R07.89 CHEST TIGHTNESS: ICD-10-CM

## 2018-06-29 RX ORDER — METHYLPREDNISOLONE 4 MG/1
TABLET ORAL
Qty: 1 DOSE PACK | Refills: 0 | Status: SHIPPED | OUTPATIENT
Start: 2018-06-29 | End: 2019-08-28 | Stop reason: ALTCHOICE

## 2018-06-29 RX ORDER — ALBUTEROL SULFATE 90 UG/1
1 AEROSOL, METERED RESPIRATORY (INHALATION)
Qty: 1 INHALER | Refills: 0 | Status: SHIPPED | OUTPATIENT
Start: 2018-06-29 | End: 2019-04-16 | Stop reason: SDUPTHER

## 2018-06-29 NOTE — TELEPHONE ENCOUNTER
Spoke to patient one hour after inhaler and she is breathing much better,Let her know we are send another Medrol Dose pack to pharmacy.

## 2018-06-29 NOTE — TELEPHONE ENCOUNTER
Pt called and said that she would like to know if she can take inhaler while she is on oral steroid. She requests someone call her back as soon as possible regarding this.  Please call back at [phone]

## 2018-06-29 NOTE — TELEPHONE ENCOUNTER
Patient is still having tightness in chest when walking around house and wanted to know if she could take inhaler. Let her know to take the inhaler and call us back. Asked doctor Jossie Smith recommendation. She will call in another Medrol Dose pack.

## 2018-06-29 NOTE — TELEPHONE ENCOUNTER
Patient is still having tightness in chest when walking around house and wanted to know if she could take inhaler. Let her know to take the inhaler and call us back. Asked doctor Yuly Diaz recommendation. She will call in another Medrol Dose pack.

## 2019-04-16 DIAGNOSIS — R07.89 CHEST TIGHTNESS: ICD-10-CM

## 2019-04-16 DIAGNOSIS — J45.20 ALLERGY-INDUCED ASTHMA, MILD INTERMITTENT, UNCOMPLICATED: ICD-10-CM

## 2019-04-16 RX ORDER — ALBUTEROL SULFATE 90 UG/1
1 AEROSOL, METERED RESPIRATORY (INHALATION)
Qty: 1 INHALER | Refills: 0 | Status: SHIPPED | OUTPATIENT
Start: 2019-04-16 | End: 2019-05-16

## 2019-06-26 ENCOUNTER — HOSPITAL ENCOUNTER (OUTPATIENT)
Dept: MAMMOGRAPHY | Age: 59
Discharge: HOME | End: 2019-06-26
Attending: INTERNAL MEDICINE
Payer: COMMERCIAL

## 2019-06-26 DIAGNOSIS — Z12.39 BREAST SCREENING, UNSPECIFIED: ICD-10-CM

## 2019-06-26 PROCEDURE — 77063 BREAST TOMOSYNTHESIS BI: CPT

## 2019-08-28 ENCOUNTER — OFFICE VISIT (OUTPATIENT)
Dept: PRIMARY CARE CLINIC | Age: 59
End: 2019-08-28

## 2019-08-28 VITALS
BODY MASS INDEX: 37.39 KG/M2 | SYSTOLIC BLOOD PRESSURE: 136 MMHG | RESPIRATION RATE: 18 BRPM | WEIGHT: 211 LBS | HEIGHT: 63 IN | TEMPERATURE: 97.8 F | OXYGEN SATURATION: 98 % | DIASTOLIC BLOOD PRESSURE: 78 MMHG | HEART RATE: 73 BPM

## 2019-08-28 DIAGNOSIS — M79.10 MYALGIA: ICD-10-CM

## 2019-08-28 DIAGNOSIS — E66.9 OBESITY (BMI 30-39.9): ICD-10-CM

## 2019-08-28 DIAGNOSIS — Z99.89 OBSTRUCTIVE SLEEP APNEA ON CPAP: ICD-10-CM

## 2019-08-28 DIAGNOSIS — E78.2 MIXED HYPERLIPIDEMIA: Primary | ICD-10-CM

## 2019-08-28 DIAGNOSIS — Z00.00 PHYSICAL EXAM: ICD-10-CM

## 2019-08-28 DIAGNOSIS — R23.2 HOT FLASHES: ICD-10-CM

## 2019-08-28 DIAGNOSIS — G43.009 MIGRAINE WITHOUT AURA AND WITHOUT STATUS MIGRAINOSUS, NOT INTRACTABLE: ICD-10-CM

## 2019-08-28 DIAGNOSIS — F34.1 DYSTHYMIA: ICD-10-CM

## 2019-08-28 DIAGNOSIS — E55.9 VITAMIN D DEFICIENCY: ICD-10-CM

## 2019-08-28 DIAGNOSIS — G47.33 OBSTRUCTIVE SLEEP APNEA ON CPAP: ICD-10-CM

## 2019-08-28 RX ORDER — TOPIRAMATE 25 MG/1
25 TABLET ORAL 2 TIMES DAILY WITH MEALS
Qty: 60 TAB | Refills: 0 | Status: SHIPPED | OUTPATIENT
Start: 2019-08-28 | End: 2019-09-25 | Stop reason: SDUPTHER

## 2019-08-28 NOTE — PROGRESS NOTES
Written by Micheal Cosme, as dictated by Dr. Antonia Lima MD.    Larry Pedro is a 63 y.o. female. HPI  The patient comes in today for a follow up & requesting a  complete physical examination as well. She is fasting for labs. She notes that she is \"over\" her menopause. She is trying a new supplement, BalanceFemme, which she reports has been helpful with her episodes. She notes she had an ablation in 2017, but notes that her hot flashes started 5 years ago. She notes that she was given patches, which helped, but she had an allergic reaction. She was then given estradiol, but has since stopped it. She notes that she has been feeling tired recently, and likewise the feeling that she will be coming down with a virus. She notes that it used to be a few times a year, but now it happens every few weeks, for a few days, and goes away. She reports myalgias and joint pains. She notes that she has been having problems with motivation as well. She used to take the Wellbutrin  mg and Trintellix 5 mg, but stopped taking it 3 months ago. She reports she has been losing weight. She reports she has tried to walk 1 mile 4 times a week and yoga, but the more that she sweats, she gets nauseous and has to stop. She would like to help with her weight if possible. She reports joint pains, but feels that her weight is a contributing factor. She tried the keto diet for a 3 months. She notes that she did not notice a difference with her appetite while on Trintellix. She notes intermittent shortness of breath with exertion. She reports her sleep has not been bad, and she is compliant on her CPAP. She does sleep well with the CPAP. She notes that her \"anxiety-like\" attacks in the middle of the night have improved. She notes that she get headaches a lot. She has not been taking Imitrex 100 mg as she did not want to take medication.     She reports taking OTC Pepcid AC as needed for heartburn. She reports she usually only takes Vitamin D in the fall and winter, as usually goes outside during the summer. 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 She notes that she stopped taking her medication due to the problems with her mother's health after her stroke. Patient Active Problem List   Diagnosis Code    Herniated disc [id number]    Migraine without aura and without status migrainosus, not intractable G43.009    Dysthymia F34.1    Obstructive sleep apnea on CPAP G47.33, Z99.89        Current Outpatient Medications on File Prior to Visit   Medication Sig Dispense Refill    OTHER BalancedFemme - 1/4 tsp BID      PROAIR HFA 90 mcg/actuation inhaler INHALE 1 PUFF BY INHALATION EVERY 6 HOURS AS NEEDED FOR WHEEZING 8.5 Inhaler 0    estradiol (ESTRACE) 1 mg tablet Take 1 Tab by mouth daily.  Cholecalciferol, Vitamin D3, (VITAMIN D3) 1,000 unit cap Take  by mouth. No current facility-administered medications on file prior to visit.         Allergies   Allergen Reactions    Tylenol-Codeine #3 [Acetaminophen-Codeine] Other (comments)     Vomitting, and upset stomach state that she is not allergic    Iodinated Contrast Media Hives       Past Medical History:   Diagnosis Date    Asthma     GERD (gastroesophageal reflux disease)     Headache     Nutcracker esophagus 2011       Past Surgical History:   Procedure Laterality Date    ABDOMEN SURGERY PROC UNLISTED      ENDOSCOPY, COLON, DIAGNOSTIC      HX GYN      HX HEENT      HX LUMBAR DISKECTOMY  9/5/2013       Family History   Problem Relation Age of Onset    Cancer Mother     Heart Disease Father     Breast Cancer Maternal Grandmother     Breast Cancer Paternal Grandmother 54    Breast Cancer Maternal Aunt     Breast Cancer Paternal Aunt        Social History     Socioeconomic History    Marital status:      Spouse name: Not on file    Number of children: Not on file    Years of education: Not on file    Highest education level: Not on file   Occupational History    Not on file   Social Needs    Financial resource strain: Not on file    Food insecurity:     Worry: Not on file     Inability: Not on file    Transportation needs:     Medical: Not on file     Non-medical: Not on file   Tobacco Use    Smoking status: Never Smoker    Smokeless tobacco: Never Used   Substance and Sexual Activity    Alcohol use: Yes     Comment: occasional    Drug use: No    Sexual activity: Yes     Partners: Male   Lifestyle    Physical activity:     Days per week: Not on file     Minutes per session: Not on file    Stress: Not on file   Relationships    Social connections:     Talks on phone: Not on file     Gets together: Not on file     Attends Hoahaoism service: Not on file     Active member of club or organization: Not on file     Attends meetings of clubs or organizations: Not on file     Relationship status: Not on file    Intimate partner violence:     Fear of current or ex partner: Not on file     Emotionally abused: Not on file     Physically abused: Not on file     Forced sexual activity: Not on file   Other Topics Concern    Not on file   Social History Narrative    Not on file       Review of Systems   Constitutional: Negative for malaise/fatigue. HENT: Negative for congestion. Eyes: Negative for blurred vision. Respiratory: Positive for shortness of breath (intermittent with exertion). Negative for wheezing. Cardiovascular: Positive for leg swelling. Negative for chest pain. Gastrointestinal: Negative for constipation, diarrhea and heartburn. Genitourinary: Negative for dysuria, frequency and urgency. Musculoskeletal: Positive for joint pain (intermittent episodes) and myalgias (intermittent episodes). Neurological: Negative for dizziness and headaches. Psychiatric/Behavioral: Positive for depression (mild). Negative for substance abuse. The patient is not nervous/anxious and does not have insomnia. Visit Vitals  /78 (BP 1 Location: Left arm, BP Patient Position: Sitting)   Pulse 73   Temp 97.8 °F (36.6 °C) (Oral)   Resp 18   Ht 5' 3\" (1.6 m)   Wt 211 lb (95.7 kg)   SpO2 98%   BMI 37.38 kg/m²       Physical Exam   Constitutional: She is oriented to person, place, and time. She appears well-developed and well-nourished. No distress. HENT:   Head: Normocephalic and atraumatic. Right Ear: Tympanic membrane, external ear and ear canal normal.   Left Ear: Tympanic membrane, external ear and ear canal normal.   Mouth/Throat: Oropharynx is clear and moist.   +R ear with cerumen   Eyes: Conjunctivae are normal. Right eye exhibits no discharge. Left eye exhibits no discharge. Neck: Normal range of motion. Neck supple. No thyromegaly present. Cardiovascular: Normal rate, regular rhythm and normal heart sounds. Exam reveals no gallop and no friction rub. No murmur heard. Pulses:       Dorsalis pedis pulses are 2+ on the right side, and 2+ on the left side. +mild edema in BL ankles   Pulmonary/Chest: Effort normal and breath sounds normal. She has no wheezes. Abdominal: Soft. Bowel sounds are normal. There is no tenderness. Musculoskeletal: Normal range of motion. +BL knees without crepitus   Lymphadenopathy:     She has no cervical adenopathy. Neurological: She is alert and oriented to person, place, and time. She has normal reflexes. Reflex Scores:       Patellar reflexes are 2+ on the right side and 2+ on the left side. Skin: She is not diaphoretic. Psychiatric: She has a normal mood and affect. Her behavior is normal. Thought content normal.   Nursing note and vitals reviewed. ASSESSMENT and PLAN    ICD-10-CM ICD-9-CM    1. Mixed hyperlipidemia E78.2 272.2 LIPID PANEL    Lipid Panel ordered.     2. Migraine without aura and without status migrainosus, not intractable G43.009 346.10 topiramate (TOPAMAX) 25 mg tablet sent to pharmacy. Topamax 25 mg prescribed. Potential side effects were discussed. No history of glaucoma or kidney stones. Pt should take 1 tab x  1 week in the evening, and if she is doing well, she can increase to BID. 3. Physical exam G02.18 Z05.0 METABOLIC PANEL, COMPREHENSIVE      CBC W/O DIFF      TSH 3RD GENERATION    Complete physical exam done. Basic labs drawn. 4. Hot flashes R23.2 782.62 Pt is managing well with BalanceFemme. 5. Obesity (BMI 30-39. 9) E66.9 278.00 Advised pt to purchase a FitBit or similar device. Discussed that a healthy lifestyle requires 5,000-7,000 steps daily, but weight loss requires 10,000 steps daily. Pt was advised to stay away from carbohydrates as well. Pt was advised to also start Wellbutrin again s it can help with weight loss. Pt was advised that Topamax can reduce cravings. 6. Myalgia M79.10 729.1 ARLETTE BY IFA W/REFLEX      CRP, HIGH SENSITIVITY    ARLETTE by IFA and CRP ordered. Pt reports recurrent episodes of myalgias. 7. Dysthymia F34.1 300.4 Pt was advised to restart Wellbutrin  mg, and to follow-up in 1 month. 8. Obstructive sleep apnea on CPAP G47.33 327.23 Compliant on CPAP.    Z99.89 V46.8    9. Vitamin D deficiency E55.9 268.9 VITAMIN D, 25 HYDROXY    Vitamin D ordered. This plan was reviewed with the patient and patient agrees. All questions were answered. This scribe documentation was reviewed by me and accurately reflects the examination and decisions made by me. This note will not be viewable in [address]

## 2019-08-28 NOTE — PROGRESS NOTES
Laura Pena is a 63 y.o. female    Chief Complaint   Patient presents with    Complete Physical     Refills - Proair inhaler    Visit Vitals  /78 (BP 1 Location: Left arm, BP Patient Position: Sitting)   Pulse 73   Temp 97.8 °F (36.6 °C) (Oral)   Resp 18   Ht 5' 3\" (1.6 m)   Wt 211 lb (95.7 kg)   SpO2 98%   BMI 37.38 kg/m²       1. Have you been to the ER, urgent care clinic since your last visit? Hospitalized since your last visit? YES, [facility] ER for chest pains about 1 month and half ago. 2. Have you seen or consulted any other health care providers outside of the [address] [place] [address] since your last visit? Include any pap smears or colon screening.  No

## 2019-08-30 LAB
25(OH)D3+25(OH)D2 SERPL-MCNC: 19.4 NG/ML (ref 30–100)
ALBUMIN SERPL-MCNC: 4.8 G/DL (ref 3.5–5.5)
ALBUMIN/GLOB SERPL: 1.6 {RATIO} (ref 1.2–2.2)
ALP SERPL-CCNC: 75 IU/L (ref 39–117)
ALT SERPL-CCNC: 21 IU/L (ref 0–32)
ANA TITR SER IF: NEGATIVE {TITER}
AST SERPL-CCNC: 21 IU/L (ref 0–40)
BILIRUB SERPL-MCNC: 0.3 MG/DL (ref 0–1.2)
BUN SERPL-MCNC: 11 MG/DL (ref 6–24)
BUN/CREAT SERPL: 15 (ref 9–23)
CALCIUM SERPL-MCNC: 10.1 MG/DL (ref 8.7–10.2)
CHLORIDE SERPL-SCNC: 102 MMOL/L (ref 96–106)
CHOLEST SERPL-MCNC: 230 MG/DL (ref 100–199)
CO2 SERPL-SCNC: 22 MMOL/L (ref 20–29)
CREAT SERPL-MCNC: 0.71 MG/DL (ref 0.57–1)
CRP SERPL HS-MCNC: 4.71 MG/L (ref 0–3)
ERYTHROCYTE [DISTWIDTH] IN BLOOD BY AUTOMATED COUNT: 13.7 % (ref 12.3–15.4)
GLOBULIN SER CALC-MCNC: 3 G/DL (ref 1.5–4.5)
GLUCOSE SERPL-MCNC: 91 MG/DL (ref 65–99)
HCT VFR BLD AUTO: 40.2 % (ref 34–46.6)
HDLC SERPL-MCNC: 61 MG/DL
HGB BLD-MCNC: 13.6 G/DL (ref 11.1–15.9)
LDLC SERPL CALC-MCNC: 129 MG/DL (ref 0–99)
MCH RBC QN AUTO: 28.8 PG (ref 26.6–33)
MCHC RBC AUTO-ENTMCNC: 33.8 G/DL (ref 31.5–35.7)
MCV RBC AUTO: 85 FL (ref 79–97)
PLATELET # BLD AUTO: 313 X10E3/UL (ref 150–450)
PLEASE NOTE, 734348: NORMAL
POTASSIUM SERPL-SCNC: 3.9 MMOL/L (ref 3.5–5.2)
PROT SERPL-MCNC: 7.8 G/DL (ref 6–8.5)
RBC # BLD AUTO: 4.72 X10E6/UL (ref 3.77–5.28)
SODIUM SERPL-SCNC: 139 MMOL/L (ref 134–144)
TRIGL SERPL-MCNC: 202 MG/DL (ref 0–149)
TSH SERPL DL<=0.005 MIU/L-ACNC: 1.1 UIU/ML (ref 0.45–4.5)
VLDLC SERPL CALC-MCNC: 40 MG/DL (ref 5–40)
WBC # BLD AUTO: 7 X10E3/UL (ref 3.4–10.8)

## 2019-08-31 NOTE — PROGRESS NOTES
Let her know Cholesterol came back high & vitamin D low. Needs to take Vitamin D3 1000 I.U daily dose. For high cholesterol & elevated CRP I would recommend weight loss. How is her Topamax working?

## 2019-09-03 NOTE — PROGRESS NOTES
Called and spoke with patient and advised of the above recommendations and results. Patient states that she has not started Topamax as of yet, but she will. No other questions or concerns voiced. Encouraged to call the office as needed. End of encounter.

## 2019-09-25 DIAGNOSIS — G43.009 MIGRAINE WITHOUT AURA AND WITHOUT STATUS MIGRAINOSUS, NOT INTRACTABLE: ICD-10-CM

## 2019-09-25 RX ORDER — TOPIRAMATE 25 MG/1
TABLET ORAL
Qty: 60 TAB | Refills: 0 | Status: SHIPPED | OUTPATIENT
Start: 2019-09-25 | End: 2019-10-02 | Stop reason: SDUPTHER

## 2019-10-02 DIAGNOSIS — G43.009 MIGRAINE WITHOUT AURA AND WITHOUT STATUS MIGRAINOSUS, NOT INTRACTABLE: ICD-10-CM

## 2019-10-02 RX ORDER — TOPIRAMATE 25 MG/1
TABLET ORAL
Qty: 180 TAB | Refills: 0 | Status: SHIPPED | OUTPATIENT
Start: 2019-10-02 | End: 2020-05-13 | Stop reason: ALTCHOICE

## 2019-10-02 NOTE — TELEPHONE ENCOUNTER
LOV: 08/28/2019  Refill: 09/23/2019-Requesting 90 D Supply  Labs: 08/28/2019  Next Appt: 10/09/2019-Perla

## 2019-12-07 DIAGNOSIS — R07.89 CHEST TIGHTNESS: ICD-10-CM

## 2019-12-07 DIAGNOSIS — J45.20 ALLERGY-INDUCED ASTHMA, MILD INTERMITTENT, UNCOMPLICATED: ICD-10-CM

## 2019-12-07 RX ORDER — ALBUTEROL SULFATE 90 UG/1
AEROSOL, METERED RESPIRATORY (INHALATION)
Qty: 8.5 INHALER | Refills: 0 | Status: SHIPPED | OUTPATIENT
Start: 2019-12-07 | End: 2020-01-25

## 2020-01-25 DIAGNOSIS — R07.89 CHEST TIGHTNESS: ICD-10-CM

## 2020-01-25 DIAGNOSIS — J45.20 ALLERGY-INDUCED ASTHMA, MILD INTERMITTENT, UNCOMPLICATED: ICD-10-CM

## 2020-01-25 RX ORDER — ALBUTEROL SULFATE 90 UG/1
AEROSOL, METERED RESPIRATORY (INHALATION)
Qty: 8.5 INHALER | Refills: 0 | Status: SHIPPED | OUTPATIENT
Start: 2020-01-25 | End: 2021-01-12 | Stop reason: SDUPTHER

## 2020-05-13 ENCOUNTER — VIRTUAL VISIT (OUTPATIENT)
Dept: PRIMARY CARE CLINIC | Age: 60
End: 2020-05-13

## 2020-05-13 DIAGNOSIS — F41.1 GENERALIZED ANXIETY DISORDER: ICD-10-CM

## 2020-05-13 DIAGNOSIS — F41.0 ANXIETY ATTACK: ICD-10-CM

## 2020-05-13 DIAGNOSIS — G43.009 MIGRAINE WITHOUT AURA AND WITHOUT STATUS MIGRAINOSUS, NOT INTRACTABLE: Primary | ICD-10-CM

## 2020-05-13 DIAGNOSIS — K22.4 ESOPHAGEAL SPASM: ICD-10-CM

## 2020-05-13 PROBLEM — F34.1 DYSTHYMIA: Status: RESOLVED | Noted: 2019-08-28 | Resolved: 2020-05-13

## 2020-05-13 RX ORDER — ALPRAZOLAM 0.5 MG/1
0.5 TABLET ORAL
Qty: 10 TAB | Refills: 0 | Status: SHIPPED | OUTPATIENT
Start: 2020-05-13 | End: 2020-07-08 | Stop reason: ALTCHOICE

## 2020-05-13 RX ORDER — NIFEDIPINE 10 MG/1
10 CAPSULE ORAL
Qty: 30 CAP | Refills: 0 | Status: SHIPPED | OUTPATIENT
Start: 2020-05-13 | End: 2020-06-04

## 2020-05-13 NOTE — PROGRESS NOTES
Written by Flora Cantu, as dictated by Dr. Aaron Romero MD.    Mary Freed is a 63 y.o. female. HPI   I was in the office while conducting this encounter. Consent:  She and/or her healthcare decision maker is aware that this patient-initiated Telehealth encounter is a billable service, with coverage as determined by her insurance carrier. She is aware that she may receive a bill and has provided verbal consent to proceed: Yes    This virtual visit was conducted via [address] Pursuant to the emergency declaration under the [facility], 1135 waiver authority and the [employer] Act, this Virtual  Visit was conducted to reduce the patient's risk of exposure to COVID-19 and provide continuity of care for an established patient. Services were provided through a video synchronous discussion virtually to substitute for in-person clinic visit. Due to this being a TeleHealth evaluation, many elements of the physical examination are unable to be assessed. The patient presents today c/o increased anxiety. She has been experiencing anxiety in the middle of the night and waking up. She has a hx of esophageal spasm that is brought on by stress and anxiety. Dr. Sudha Cardenas (GI) prescribed her Xanax two years ago for her spasms. She took a Xanax yesterday and it did help her. She has tried Effexor before, she has tried Trintellix and it did help her. She has tried Wellbutrin before and it helped her depression but after taking two tabs it caused her to become easily agitated. She has tried Prozac before but not sure how it worked. She has not checked her BP in some time. The last time she had it checked was when she had endoscopy done in January 2020 and it came back normal.     She has a hx of migraines. She has had two mild migraines in the last two months.  She has discontinued Topamax 25 mg. She went on a cruise in December 2019 and believes she contracted COVID-19. However, she is not having any symptoms now. Patient Active Problem List   Diagnosis Code    Herniated disc [id number]    Migraine without aura and without status migrainosus, not intractable G43.009    Dysthymia F34.1    Obstructive sleep apnea on CPAP G47.33, Z99.89        Current Outpatient Medications on File Prior to Visit   Medication Sig Dispense Refill    PROAIR HFA 90 mcg/actuation inhaler TAKE 1 PUFF BY INHALATION EVERY SIX (6) HOURS AS NEEDED FOR WHEEZING FOR UP TO 30 DAYS 8.5 Inhaler 0    topiramate (TOPAMAX) 25 mg tablet TAKE 1 TAB BY MOUTH TWO (2) TIMES DAILY (WITH MEALS) 180 Tab 0    OTHER BalancedFemme - 1/4 tsp BID      estradiol (ESTRACE) 1 mg tablet Take 1 Tab by mouth daily.  Cholecalciferol, Vitamin D3, (VITAMIN D3) 1,000 unit cap Take  by mouth. No current facility-administered medications on file prior to visit.         Allergies   Allergen Reactions    Tylenol-Codeine #3 [Acetaminophen-Codeine] Other (comments)     Vomitting, and upset stomach state that she is not allergic    Iodinated Contrast Media Hives       Past Medical History:   Diagnosis Date    Asthma     GERD (gastroesophageal reflux disease)     Headache     Nutcracker esophagus 2011       Past Surgical History:   Procedure Laterality Date    ABDOMEN SURGERY PROC UNLISTED      ENDOSCOPY, COLON, DIAGNOSTIC      HX GYN      HX HEENT      HX LUMBAR DISKECTOMY  9/5/2013       Family History   Problem Relation Age of Onset    Cancer Mother     Heart Disease Father     Breast Cancer Maternal Grandmother     Breast Cancer Paternal Grandmother 54    Breast Cancer Maternal Aunt     Breast Cancer Paternal Aunt        Social History     Socioeconomic History    Marital status:      Spouse name: Not on file    Number of children: Not on file    Years of education: Not on file    Highest education level: Not on file   Occupational History    Not on file   Social Needs    Financial resource strain: Not on file    Food insecurity     Worry: Not on file     Inability: Not on file    Transportation needs     Medical: Not on file     Non-medical: Not on file   Tobacco Use    Smoking status: Never Smoker    Smokeless tobacco: Never Used   Substance and Sexual Activity    Alcohol use: Yes     Comment: occasional    Drug use: No    Sexual activity: Yes     Partners: Male   Lifestyle    Physical activity     Days per week: Not on file     Minutes per session: Not on file    Stress: Not on file   Relationships    Social connections     Talks on phone: Not on file     Gets together: Not on file     Attends Jew service: Not on file     Active member of club or organization: Not on file     Attends meetings of clubs or organizations: Not on file     Relationship status: Not on file    Intimate partner violence     Fear of current or ex partner: Not on file     Emotionally abused: Not on file     Physically abused: Not on file     Forced sexual activity: Not on file   Other Topics Concern    Not on file   Social History Narrative    Not on file       No visits with results within 3 Month(s) from this visit.    Latest known visit with results is:   Office Visit on 08/28/2019   Component Date Value Ref Range Status    Glucose 08/28/2019 91  65 - 99 mg/dL Final    BUN 08/28/2019 11  6 - 24 mg/dL Final    Creatinine 08/28/2019 0.71  0.57 - 1.00 mg/dL Final    GFR est non-AA 08/28/2019 95  >59 mL/min/1.73 Final    GFR est AA 08/28/2019 109  >59 mL/min/1.73 Final    BUN/Creatinine ratio 08/28/2019 15  9 - 23 Final    Sodium 08/28/2019 139  134 - 144 mmol/L Final    Potassium 08/28/2019 3.9  3.5 - 5.2 mmol/L Final    Chloride 08/28/2019 102  96 - 106 mmol/L Final    CO2 08/28/2019 22  20 - 29 mmol/L Final    Calcium 08/28/2019 10.1  8.7 - 10.2 mg/dL Final    Protein, total 08/28/2019 7.8  6.0 - 8.5 g/dL Final    Albumin 08/28/2019 4.8  3.5 - 5.5 g/dL Final    GLOBULIN, TOTAL 08/28/2019 3.0  1.5 - 4.5 g/dL Final    A-G Ratio 08/28/2019 1.6  1.2 - 2.2 Final    Bilirubin, total 08/28/2019 0.3  0.0 - 1.2 mg/dL Final    Alk. phosphatase 08/28/2019 75  39 - 117 IU/L Final    AST (SGOT) 08/28/2019 21  0 - 40 IU/L Final    ALT (SGPT) 08/28/2019 21  0 - 32 IU/L Final    WBC 08/28/2019 7.0  3.4 - 10.8 x10E3/uL Final    RBC 08/28/2019 4.72  3.77 - 5.28 x10E6/uL Final    HGB 08/28/2019 13.6  11.1 - 15.9 g/dL Final    HCT 08/28/2019 40.2  34.0 - 46.6 % Final    MCV 08/28/2019 85  79 - 97 fL Final    MCH 08/28/2019 28.8  26.6 - 33.0 pg Final    MCHC 08/28/2019 33.8  31.5 - 35.7 g/dL Final    RDW 08/28/2019 13.7  12.3 - 15.4 % Final    PLATELET 79/59/0163 864  150 - 450 x10E3/uL Final    Cholesterol, total 08/28/2019 230* 100 - 199 mg/dL Final    Triglyceride 08/28/2019 202* 0 - 149 mg/dL Final    HDL Cholesterol 08/28/2019 61  >39 mg/dL Final    VLDL, calculated 08/28/2019 40  5 - 40 mg/dL Final    LDL, calculated 08/28/2019 129* 0 - 99 mg/dL Final    TSH 08/28/2019 1.100  0.450 - 4.500 uIU/mL Final    VITAMIN D, 25-HYDROXY 08/28/2019 19.4* 30.0 - 100.0 ng/mL Final    Comment: Vitamin D deficiency has been defined by the [address] practice guideline as a  level of serum 25-OH vitamin D less than 20 ng/mL (1,2). The Endocrine Society went on to further define vitamin D  insufficiency as a level between 21 and 29 ng/mL (2). 1. IOM ([place] of Medicine). 2010. Dietary reference     intakes for calcium and D. [address] [facility]: The     [employer]. 2. Mandi MF, Marilee ORR, Suzan LEA, et al.     Evaluation, treatment, and prevention of vitamin D     deficiency: an Endocrine Society clinical practice     guideline. JCEM. 2011 Jul; 96(7):1911-30.       Antinuclear Abs, IFA 08/28/2019 Negative   Final    Comment: Negative   <1:80                                       Borderline  1:80                                       Positive   >1:80      Please note 08/28/2019 Comment   Final    Comment: ARLETTE Multiplex methodology was designed to detect up to 11 antibodies  of the 100+ antibodies that may be detected by ARLETTE IFA methodology.  C-Reactive Protein, Cardiac 08/28/2019 4.71* 0.00 - 3.00 mg/L Final    Comment:          Relative Risk for Future Cardiovascular Event                               Low                 <1.00                               Average       1.00 - 3.00                               High                >3.00         Review of Systems   Constitutional: Negative for malaise/fatigue. HENT: Negative for congestion. Eyes: Negative for blurred vision. Respiratory: Negative for shortness of breath. Cardiovascular: Negative for chest pain. Gastrointestinal: Negative for heartburn. Esophageal spasm    Genitourinary: Negative for dysuria. Musculoskeletal: Negative for joint pain and myalgias. Neurological: Positive for headaches. Negative for dizziness. Psychiatric/Behavioral: Negative for depression and substance abuse. The patient is nervous/anxious. The patient does not have insomnia. There were no vitals taken for this visit. Physical Exam  Constitutional:       General: She is not in acute distress. Appearance: She is well-developed. She is not diaphoretic. HENT:      Head: Normocephalic and atraumatic. Nose: No congestion. Eyes:      General:         Right eye: No discharge. Left eye: No discharge. Conjunctiva/sclera: Conjunctivae normal.   Pulmonary:      Effort: Pulmonary effort is normal. No respiratory distress. Neurological:      Mental Status: She is alert and oriented to person, place, and time. Psychiatric:         Behavior: Behavior normal.         Thought Content:  Thought content normal.         ASSESSMENT and PLAN    ICD-10-CM ICD-9-CM    1. Migraine without aura and without status migrainosus, not intractable G43.009 346.10 Stable at this time. No medication changes needed at this time. 2. Esophageal spasm K22.4 530.5 Followed by GI     NIFEdipine (PROCARDIA) 10 mg capsule sent to pharmacy     Procardia 10 mg prescribed Potential side effects were discussed. Pt should take 1 cap daily     She can let me know how this works for her    3. Anxiety attack F41.0 300.01 Xanax given to use as needed. 4. Generalized anxiety disorder F41.1 300.02 vortioxetine (TRINTELLIX) 10 mg tablet sent to pharmacy     Trintellix 10 mg prescribed Potential side effects were discussed. Pt should take 1 tab daily           Total Time: minutes: 21-30 minutes. This plan was reviewed with the patient and patient agrees. All questions were answered. This scribe documentation was reviewed by me and accurately reflects the examination and decisions made by me. This note will not be viewable in [address]

## 2020-06-04 DIAGNOSIS — K22.4 ESOPHAGEAL SPASM: ICD-10-CM

## 2020-06-04 RX ORDER — NIFEDIPINE 10 MG/1
10 CAPSULE ORAL
Qty: 30 CAP | Refills: 0 | Status: SHIPPED | OUTPATIENT
Start: 2020-06-04 | End: 2020-06-22 | Stop reason: SDUPTHER

## 2020-06-22 DIAGNOSIS — K22.4 ESOPHAGEAL SPASM: ICD-10-CM

## 2020-06-22 RX ORDER — NIFEDIPINE 10 MG/1
10 CAPSULE ORAL
Qty: 30 CAP | Refills: 0 | Status: SHIPPED | OUTPATIENT
Start: 2020-06-22 | End: 2020-06-23 | Stop reason: SDUPTHER

## 2020-06-23 ENCOUNTER — TELEPHONE (OUTPATIENT)
Dept: PRIMARY CARE CLINIC | Age: 60
End: 2020-06-23

## 2020-06-23 DIAGNOSIS — K22.4 ESOPHAGEAL SPASM: ICD-10-CM

## 2020-06-23 RX ORDER — NIFEDIPINE 10 MG/1
10 CAPSULE ORAL
Qty: 90 CAP | Refills: 0 | Status: SHIPPED | OUTPATIENT
Start: 2020-06-23 | End: 2020-07-08 | Stop reason: ALTCHOICE

## 2020-06-23 NOTE — TELEPHONE ENCOUNTER
trintellix 10 mg is not covered by patients insurance.   Alternatives include with highest percent of coverage  Citalopram Hydrobromide  Fluoxetine HCI  Sertraline HCI  Venlafaxine HCI ER    Script written on 5/13/2020

## 2020-06-23 NOTE — LETTER
6/25/2020 11:13 AM 
 
[phone] Ms Kassi Varela, Attempted to reach you by phone. Since Trintellix is not covered by your insurance We need to know any of the alternatives below you have tried. Please contact office at [phone] Alternatives include with highest percent of coverage Citalopram Hydrobromide Fluoxetine HCI Sertraline HCI Venlafaxine HCI ER 
  
 
Sincerely, Laura Cortez MD

## 2020-06-29 ENCOUNTER — TELEPHONE (OUTPATIENT)
Dept: PRIMARY CARE CLINIC | Age: 60
End: 2020-06-29

## 2020-06-29 DIAGNOSIS — F41.9 ANXIETY: Primary | ICD-10-CM

## 2020-06-29 RX ORDER — FLUOXETINE HYDROCHLORIDE 20 MG/1
20 CAPSULE ORAL DAILY
Qty: 30 CAP | Refills: 0 | Status: SHIPPED | OUTPATIENT
Start: 2020-06-29 | End: 2020-07-23 | Stop reason: SDUPTHER

## 2020-06-29 NOTE — TELEPHONE ENCOUNTER
Tell her I sent 20 mg dose to the pharmacy. Should start that and if needed after a month we can go up on the dose.

## 2020-06-29 NOTE — TELEPHONE ENCOUNTER
Patient is calling to let you know that she's been on Prozac before and her insurance isn't covering the other medication. She said Lidia Ingram wanted to her to call back and let her know what medications she use to be on. If any questions call her back.

## 2020-07-08 ENCOUNTER — VIRTUAL VISIT (OUTPATIENT)
Dept: PRIMARY CARE CLINIC | Age: 60
End: 2020-07-08

## 2020-07-08 DIAGNOSIS — K22.4 ESOPHAGEAL SPASM: ICD-10-CM

## 2020-07-08 DIAGNOSIS — F41.1 GENERALIZED ANXIETY DISORDER: Primary | ICD-10-CM

## 2020-07-08 NOTE — PROGRESS NOTES
Written by Aadm Maier, as dictated by Dr. Tobi De La Cruz MD.    Minnie Norris is a 63 y.o. female. HPI  I was in the office while conducting this encounter. Consent:  She and/or her healthcare decision maker is aware that this patient-initiated Telehealth encounter is a billable service, with coverage as determined by her insurance carrier. She is aware that she may receive a bill and has provided verbal consent to proceed: Yes    This virtual visit was conducted via [address] Pursuant to the emergency declaration under the [facility], 1135 waiver authority and the [employer] Act, this Virtual  Visit was conducted to reduce the patient's risk of exposure to COVID-19 and provide continuity of care for an established patient. Services were provided through a video synchronous discussion virtually to substitute for in-person clinic visit. Due to this being a TeleHealth evaluation, many elements of the physical examination are unable to be assessed. Pt presents today to discuss Prozac sfx. She has recently started taking Prozac, and she has been feeling tired and nauseous. She is wondering if this is a temporary problem or not. The nausea is not as severe today as it as been in the past few days. She thinks the Prozac might be helping her, but she has still been feeling sad due to not being able to see her mother, who is in an assisted living home. She has d/c nifedipine 10 mg, which she had been taking for esophageal spasms. She has not been experiencing esophageal spasms recently.      Patient Active Problem List   Diagnosis Code    Herniated disc [id number]    Migraine without aura and without status migrainosus, not intractable G43.009    Obstructive sleep apnea on CPAP G47.33, Z99.89    Esophageal spasm K22.4        Current Outpatient Medications on File Prior to Visit   Medication Sig Dispense Refill    FLUoxetine (PROzac) 20 mg capsule Take 1 Cap by mouth daily for 30 days. 30 Cap 0    NIFEdipine (PROCARDIA) 10 mg capsule Take 1 Cap by mouth nightly for 90 days. 90 Cap 0    ALPRAZolam (XANAX) 0.5 mg tablet Take 1 Tab by mouth nightly as needed for Anxiety for up to 10 doses. Max Daily Amount: 0.5 mg. 10 Tab 0    PROAIR HFA 90 mcg/actuation inhaler TAKE 1 PUFF BY INHALATION EVERY SIX (6) HOURS AS NEEDED FOR WHEEZING FOR UP TO 30 DAYS 8.5 Inhaler 0    OTHER BalancedFemme - 1/4 tsp BID      estradiol (ESTRACE) 1 mg tablet Take 1 Tab by mouth daily.  Cholecalciferol, Vitamin D3, (VITAMIN D3) 1,000 unit cap Take  by mouth. No current facility-administered medications on file prior to visit.         Allergies   Allergen Reactions    Tylenol-Codeine #3 [Acetaminophen-Codeine] Other (comments)     Vomitting, and upset stomach state that she is not allergic    Iodinated Contrast Media Hives       Past Medical History:   Diagnosis Date    Asthma     GERD (gastroesophageal reflux disease)     Headache     Nutcracker esophagus 2011       Past Surgical History:   Procedure Laterality Date    ABDOMEN SURGERY PROC UNLISTED      ENDOSCOPY, COLON, DIAGNOSTIC      HX GYN      HX HEENT      HX LUMBAR DISKECTOMY  9/5/2013       Family History   Problem Relation Age of Onset    Cancer Mother     Heart Disease Father     Breast Cancer Maternal Grandmother     Breast Cancer Paternal Grandmother 54    Breast Cancer Maternal Aunt     Breast Cancer Paternal Aunt        Social History     Socioeconomic History    Marital status:      Spouse name: Not on file    Number of children: Not on file    Years of education: Not on file    Highest education level: Not on file   Occupational History    Not on file   Social Needs    Financial resource strain: Not on file    Food insecurity     Worry: Not on file     Inability: Not on file   A2B needs Medical: Not on file     Non-medical: Not on file   Tobacco Use    Smoking status: Never Smoker    Smokeless tobacco: Never Used   Substance and Sexual Activity    Alcohol use: Yes     Comment: occasional    Drug use: No    Sexual activity: Yes     Partners: Male   Lifestyle    Physical activity     Days per week: Not on file     Minutes per session: Not on file    Stress: Not on file   Relationships    Social connections     Talks on phone: Not on file     Gets together: Not on file     Attends [employer] service: Not on file     Active member of club or organization: Not on file     Attends meetings of clubs or organizations: Not on file     Relationship status: Not on file    Intimate partner violence     Fear of current or ex partner: Not on file     Emotionally abused: Not on file     Physically abused: Not on file     Forced sexual activity: Not on file   Other Topics Concern    Not on file   Social History Narrative    Not on file       No visits with results within 3 Month(s) from this visit.    Latest known visit with results is:   Office Visit on 08/28/2019   Component Date Value Ref Range Status    Glucose 08/28/2019 91  65 - 99 mg/dL Final    BUN 08/28/2019 11  6 - 24 mg/dL Final    Creatinine 08/28/2019 0.71  0.57 - 1.00 mg/dL Final    GFR est non-AA 08/28/2019 95  >59 mL/min/1.73 Final    GFR est AA 08/28/2019 109  >59 mL/min/1.73 Final    BUN/Creatinine ratio 08/28/2019 15  9 - 23 Final    Sodium 08/28/2019 139  134 - 144 mmol/L Final    Potassium 08/28/2019 3.9  3.5 - 5.2 mmol/L Final    Chloride 08/28/2019 102  96 - 106 mmol/L Final    CO2 08/28/2019 22  20 - 29 mmol/L Final    Calcium 08/28/2019 10.1  8.7 - 10.2 mg/dL Final    Protein, total 08/28/2019 7.8  6.0 - 8.5 g/dL Final    Albumin 08/28/2019 4.8  3.5 - 5.5 g/dL Final    GLOBULIN, TOTAL 08/28/2019 3.0  1.5 - 4.5 g/dL Final    A-G Ratio 08/28/2019 1.6  1.2 - 2.2 Final    Bilirubin, total 08/28/2019 0.3  0.0 - 1.2 mg/dL Final    Alk. phosphatase 08/28/2019 75  39 - 117 IU/L Final    AST (SGOT) 08/28/2019 21  0 - 40 IU/L Final    ALT (SGPT) 08/28/2019 21  0 - 32 IU/L Final    WBC 08/28/2019 7.0  3.4 - 10.8 x10E3/uL Final    RBC 08/28/2019 4.72  3.77 - 5.28 x10E6/uL Final    HGB 08/28/2019 13.6  11.1 - 15.9 g/dL Final    HCT 08/28/2019 40.2  34.0 - 46.6 % Final    MCV 08/28/2019 85  79 - 97 fL Final    MCH 08/28/2019 28.8  26.6 - 33.0 pg Final    MCHC 08/28/2019 33.8  31.5 - 35.7 g/dL Final    RDW 08/28/2019 13.7  12.3 - 15.4 % Final    PLATELET 18/82/3946 362  150 - 450 x10E3/uL Final    Cholesterol, total 08/28/2019 230* 100 - 199 mg/dL Final    Triglyceride 08/28/2019 202* 0 - 149 mg/dL Final    HDL Cholesterol 08/28/2019 61  >39 mg/dL Final    VLDL, calculated 08/28/2019 40  5 - 40 mg/dL Final    LDL, calculated 08/28/2019 129* 0 - 99 mg/dL Final    TSH 08/28/2019 1.100  0.450 - 4.500 uIU/mL Final    VITAMIN D, 25-HYDROXY 08/28/2019 19.4* 30.0 - 100.0 ng/mL Final    Comment: Vitamin D deficiency has been defined by the [facility] practice guideline as a  level of serum 25-OH vitamin D less than 20 ng/mL (1,2). The Endocrine Society went on to further define vitamin D  insufficiency as a level between 21 and 29 ng/mL (2). 1. IOM ([place] of Medicine). 2010. Dietary reference     intakes for calcium and D. [address] [facility]: The     [employer]. 2. Mandi MF, Marilee NC, Suzan LEA, et al.     Evaluation, treatment, and prevention of vitamin D     deficiency: an Endocrine Society clinical practice     guideline. JCEM. 2011 Jul; 96(7):1911-30.       Antinuclear Abs, IFA 08/28/2019 Negative   Final    Comment:                                      Negative   <1:80                                       Borderline  1:80                                       Positive   >1:80      Please note 08/28/2019 Comment   Final    Comment: ARLETTE Multiplex methodology was designed to detect up to 11 antibodies  of the 100+ antibodies that may be detected by ARLETTE IFA methodology.  C-Reactive Protein, Cardiac 08/28/2019 4.71* 0.00 - 3.00 mg/L Final    Comment:          Relative Risk for Future Cardiovascular Event                               Low                 <1.00                               Average       1.00 - 3.00                               High                >3.00       Review of Systems   Constitutional: Negative for malaise/fatigue and weight loss. HENT: Negative for congestion and sore throat. Eyes: Negative for blurred vision. Respiratory: Negative for cough and shortness of breath. Cardiovascular: Negative for chest pain and leg swelling. Gastrointestinal: Positive for nausea. Negative for constipation and heartburn. Genitourinary: Negative for frequency and urgency. Musculoskeletal: Negative for back pain, joint pain and myalgias. Neurological: Negative for dizziness and headaches. Psychiatric/Behavioral: Positive for depression. The patient is not nervous/anxious and does not have insomnia. There were no vitals taken for this visit. Physical Exam  Nursing note reviewed. Constitutional:       Appearance: Normal appearance. She is well-developed and well-groomed. HENT:      Head: Normocephalic and atraumatic. Nose: No congestion. Eyes:      General:         Right eye: No discharge. Left eye: No discharge. Pulmonary:      Effort: Pulmonary effort is normal.      Breath sounds: No wheezing. Neurological:      Mental Status: She is alert and oriented to person, place, and time. Psychiatric:         Mood and Affect: Mood normal.         Behavior: Behavior normal.       ASSESSMENT and PLAN    ICD-10-CM ICD-9-CM    1. Generalized anxiety disorder F41.1 300.02 She is willing to continue on Prozac despite the sfx.  I suggested that she continue taking Prozac, since it may take a little time to start working. If nausea comes back or anxiety doesn`t get better should let me know and will switch to another agent. 2. Esophageal spasm K22.4 530.5 Stable at this time. No need for medication. This plan was reviewed with the patient and patient agrees. All questions were answered. This scribe documentation was reviewed by me and accurately reflects the examination and decisions made by me. This note will not be viewable in [address]

## 2020-07-23 DIAGNOSIS — F41.9 ANXIETY: ICD-10-CM

## 2020-07-23 RX ORDER — FLUOXETINE HYDROCHLORIDE 20 MG/1
20 CAPSULE ORAL DAILY
Qty: 30 CAP | Refills: 0 | Status: SHIPPED | OUTPATIENT
Start: 2020-07-23 | End: 2020-07-24 | Stop reason: SDUPTHER

## 2020-07-24 DIAGNOSIS — F41.9 ANXIETY: ICD-10-CM

## 2020-07-24 RX ORDER — FLUOXETINE HYDROCHLORIDE 20 MG/1
20 CAPSULE ORAL DAILY
Qty: 90 CAP | Refills: 0 | Status: SHIPPED | OUTPATIENT
Start: 2020-07-24 | End: 2020-10-22

## 2020-07-27 DIAGNOSIS — F32.89 OTHER DEPRESSION: Primary | ICD-10-CM

## 2020-07-27 RX ORDER — BUPROPION HYDROCHLORIDE 100 MG/1
100 TABLET ORAL 2 TIMES DAILY
Qty: 60 TAB | Refills: 0 | Status: SHIPPED | OUTPATIENT
Start: 2020-07-27 | End: 2020-08-26

## 2020-10-22 DIAGNOSIS — F41.9 ANXIETY: ICD-10-CM

## 2020-10-22 RX ORDER — FLUOXETINE HYDROCHLORIDE 20 MG/1
CAPSULE ORAL
Qty: 90 CAP | Refills: 0 | Status: SHIPPED | OUTPATIENT
Start: 2020-10-22 | End: 2021-01-12 | Stop reason: ALTCHOICE

## 2021-01-12 ENCOUNTER — VIRTUAL VISIT (OUTPATIENT)
Dept: PRIMARY CARE CLINIC | Age: 61
End: 2021-01-12
Payer: COMMERCIAL

## 2021-01-12 ENCOUNTER — PATIENT MESSAGE (OUTPATIENT)
Dept: PRIMARY CARE CLINIC | Age: 61
End: 2021-01-12

## 2021-01-12 DIAGNOSIS — R43.0 LOSS OF SMELL: ICD-10-CM

## 2021-01-12 DIAGNOSIS — J40 BRONCHITIS DUE TO COVID-19 VIRUS: Primary | ICD-10-CM

## 2021-01-12 DIAGNOSIS — R07.89 CHEST TIGHTNESS: ICD-10-CM

## 2021-01-12 DIAGNOSIS — M79.10 MYALGIA: ICD-10-CM

## 2021-01-12 DIAGNOSIS — J45.20 ALLERGY-INDUCED ASTHMA, MILD INTERMITTENT, UNCOMPLICATED: ICD-10-CM

## 2021-01-12 DIAGNOSIS — R61 EXCESSIVE SWEATING: ICD-10-CM

## 2021-01-12 DIAGNOSIS — U07.1 BRONCHITIS DUE TO COVID-19 VIRUS: Primary | ICD-10-CM

## 2021-01-12 DIAGNOSIS — I10 ESSENTIAL HYPERTENSION: ICD-10-CM

## 2021-01-12 DIAGNOSIS — R06.09 DYSPNEA ON EXERTION: ICD-10-CM

## 2021-01-12 PROCEDURE — 99214 OFFICE O/P EST MOD 30 MIN: CPT | Performed by: INTERNAL MEDICINE

## 2021-01-12 RX ORDER — ALBUTEROL SULFATE 90 UG/1
1 AEROSOL, METERED RESPIRATORY (INHALATION)
Qty: 8.5 INHALER | Refills: 0 | Status: SHIPPED | OUTPATIENT
Start: 2021-01-12 | End: 2022-01-04 | Stop reason: SDUPTHER

## 2021-01-12 RX ORDER — PREDNISONE 20 MG/1
TABLET ORAL
Qty: 12 TAB | Refills: 0 | Status: SHIPPED | OUTPATIENT
Start: 2021-01-12 | End: 2021-05-04 | Stop reason: ALTCHOICE

## 2021-01-12 RX ORDER — AMLODIPINE BESYLATE 5 MG/1
5 TABLET ORAL DAILY
Qty: 30 TAB | Refills: 0 | Status: SHIPPED | OUTPATIENT
Start: 2021-01-12 | End: 2021-02-03

## 2021-01-12 RX ORDER — AZITHROMYCIN 250 MG/1
TABLET, FILM COATED ORAL
Qty: 6 TAB | Refills: 0 | Status: SHIPPED | OUTPATIENT
Start: 2021-01-12 | End: 2021-01-17

## 2021-01-12 RX ORDER — BENZONATATE 200 MG/1
200 CAPSULE ORAL
Qty: 21 CAP | Refills: 0 | Status: SHIPPED | OUTPATIENT
Start: 2021-01-12 | End: 2021-01-19

## 2021-01-12 NOTE — TELEPHONE ENCOUNTER
From: Madelyn Frame  To: Aaron Romero MD  Sent: 1/12/2021 6:20 AM EST  Subject: Non-Urgent Medical Question    Tested positive for COVID on Sat and having some breathing issues. Any meds that will help? Can I take the ones for COVID?

## 2021-01-12 NOTE — PROGRESS NOTES
Ambika Lau (: 1960) is a 62 y.o. female, established patient, here for evaluation of the following chief complaint(s):   No chief complaint on file. Written by Jonas Fuentes, as dictated by Dr. Mahamed Bee MD.    SUBJECTIVE/OBJECTIVE:  HPI  Pt presents virtually today to discuss testing positive for COVID. 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 She originally tested negative at [facility] and returned to work, but as more and more coworkers were coming back positive she began feeling worse. She has bronchitis-like sx and went to [facility] where she tested positive for COVID. Today, she has a cough, chest tightness, SOB on exertion, myalgias, and diaphoresis. She has had absolutely no sense of taste or smell for the past few days. Denies fever. She did not get her flu vaccine this season. She started taking vitamin D3 2000 IU every day recently but has not been taking any of it since she got sick. Her BP is elevated today at 140/80 and she notes that she has some stress at home with a family member on hospice care. Patient Active Problem List   Diagnosis Code    Herniated disc [id number]    Migraine without aura and without status migrainosus, not intractable G43.009    Obstructive sleep apnea on CPAP G47.33, Z99.89    Esophageal spasm K22.4        Current Outpatient Medications on File Prior to Visit   Medication Sig Dispense Refill    [DISCONTINUED] FLUoxetine (PROzac) 20 mg capsule TAKE 1 CAPSULE BY MOUTH EVERY DAY 90 Cap 0    PROAIR HFA 90 mcg/actuation inhaler TAKE 1 PUFF BY INHALATION EVERY SIX (6) HOURS AS NEEDED FOR WHEEZING FOR UP TO 30 DAYS 8.5 Inhaler 0    [DISCONTINUED] OTHER BalancedFemme - 1/4 tsp BID      estradiol (ESTRACE) 1 mg tablet Take 1 Tab by mouth daily.  [DISCONTINUED] Cholecalciferol, Vitamin D3, (VITAMIN D3) 1,000 unit cap Take  by mouth.        No current facility-administered medications on file prior to visit.         Allergies   Allergen Reactions    Tylenol-Codeine #3 [Acetaminophen-Codeine] Other (comments)     Vomitting, and upset stomach state that she is not allergic    Iodinated Contrast Media Hives       Past Medical History:   Diagnosis Date    Asthma     GERD (gastroesophageal reflux disease)     Headache     Nutcracker esophagus 2011       Past Surgical History:   Procedure Laterality Date    ABDOMEN SURGERY PROC UNLISTED      ENDOSCOPY, COLON, DIAGNOSTIC      HX GYN      HX HEENT      HX LUMBAR DISKECTOMY  9/5/2013       Family History   Problem Relation Age of Onset    Cancer Mother     Heart Disease Father     Breast Cancer Maternal Grandmother     Breast Cancer Paternal Grandmother 54    Breast Cancer Maternal Aunt     Breast Cancer Paternal Aunt        Social History     Socioeconomic History    Marital status:      Spouse name: Not on file    Number of children: Not on file    Years of education: Not on file    Highest education level: Not on file   Occupational History    Not on file   Social Needs    Financial resource strain: Not on file    Food insecurity     Worry: Not on file     Inability: Not on file    Transportation needs     Medical: Not on file     Non-medical: Not on file   Tobacco Use    Smoking status: Never Smoker    Smokeless tobacco: Never Used   Substance and Sexual Activity    Alcohol use: Yes     Comment: occasional    Drug use: No    Sexual activity: Yes     Partners: Male   Lifestyle    Physical activity     Days per week: Not on file     Minutes per session: Not on file    Stress: Not on file   Relationships    Social connections     Talks on phone: Not on file     Gets together: Not on file     Attends Confucianism service: Not on file     Active member of club or organization: Not on file     Attends meetings of clubs or organizations: Not on file     Relationship status: Not on file    Intimate partner violence     Fear of current or ex partner: Not on file     Emotionally abused: Not on file     Physically abused: Not on file     Forced sexual activity: Not on file   Other Topics Concern    Not on file   Social History Narrative    Not on file       No visits with results within 3 Month(s) from this visit. Latest known visit with results is:   Office Visit on 08/28/2019   Component Date Value Ref Range Status    Glucose 08/28/2019 91  65 - 99 mg/dL Final    BUN 08/28/2019 11  6 - 24 mg/dL Final    Creatinine 08/28/2019 0.71  0.57 - 1.00 mg/dL Final    GFR est non-AA 08/28/2019 95  >59 mL/min/1.73 Final    GFR est AA 08/28/2019 109  >59 mL/min/1.73 Final    BUN/Creatinine ratio 08/28/2019 15  9 - 23 Final    Sodium 08/28/2019 139  134 - 144 mmol/L Final    Potassium 08/28/2019 3.9  3.5 - 5.2 mmol/L Final    Chloride 08/28/2019 102  96 - 106 mmol/L Final    CO2 08/28/2019 22  20 - 29 mmol/L Final    Calcium 08/28/2019 10.1  8.7 - 10.2 mg/dL Final    Protein, total 08/28/2019 7.8  6.0 - 8.5 g/dL Final    Albumin 08/28/2019 4.8  3.5 - 5.5 g/dL Final    GLOBULIN, TOTAL 08/28/2019 3.0  1.5 - 4.5 g/dL Final    A-G Ratio 08/28/2019 1.6  1.2 - 2.2 Final    Bilirubin, total 08/28/2019 0.3  0.0 - 1.2 mg/dL Final    Alk.  phosphatase 08/28/2019 75  39 - 117 IU/L Final    AST (SGOT) 08/28/2019 21  0 - 40 IU/L Final    ALT (SGPT) 08/28/2019 21  0 - 32 IU/L Final    WBC 08/28/2019 7.0  3.4 - 10.8 x10E3/uL Final    RBC 08/28/2019 4.72  3.77 - 5.28 x10E6/uL Final    HGB 08/28/2019 13.6  11.1 - 15.9 g/dL Final    HCT 08/28/2019 40.2  34.0 - 46.6 % Final    MCV 08/28/2019 85  79 - 97 fL Final    MCH 08/28/2019 28.8  26.6 - 33.0 pg Final    MCHC 08/28/2019 33.8  31.5 - 35.7 g/dL Final    RDW 08/28/2019 13.7  12.3 - 15.4 % Final    PLATELET 61/61/5510 050  150 - 450 x10E3/uL Final    Cholesterol, total 08/28/2019 230* 100 - 199 mg/dL Final    Triglyceride 08/28/2019 202* 0 - 149 mg/dL Final    HDL Cholesterol 08/28/2019 61  >39 mg/dL Final    VLDL, calculated 08/28/2019 40  5 - 40 mg/dL Final    LDL, calculated 08/28/2019 129* 0 - 99 mg/dL Final    TSH 08/28/2019 1.100  0.450 - 4.500 uIU/mL Final    VITAMIN D, 25-HYDROXY 08/28/2019 19.4* 30.0 - 100.0 ng/mL Final    Comment: Vitamin D deficiency has been defined by the [address] practice guideline as a  level of serum 25-OH vitamin D less than 20 ng/mL (1,2). The Endocrine Society went on to further define vitamin D  insufficiency as a level between 21 and 29 ng/mL (2). 1. IOM ([place] of Medicine). 2010. Dietary reference     intakes for calcium and D. [facility]: The     [employer]. 2. Mandi MF, Marilee ORR, Suzan LEA, et al.     Evaluation, treatment, and prevention of vitamin D     deficiency: an Endocrine Society clinical practice     guideline. JCEM. 2011 Jul; 96(7):1911-30.  Antinuclear Abs, IFA 08/28/2019 Negative   Final    Comment:                                      Negative   <1:80                                       Borderline  1:80                                       Positive   >1:80      Please note 08/28/2019 Comment   Final    Comment: ARLETTE Multiplex methodology was designed to detect up to 11 antibodies  of the 100+ antibodies that may be detected by ARLETTE IFA methodology.  C-Reactive Protein, Cardiac 08/28/2019 4.71* 0.00 - 3.00 mg/L Final    Comment:          Relative Risk for Future Cardiovascular Event                               Low                 <1.00                               Average       1.00 - 3.00                               High                >3.00       Review of Systems   Constitutional: Positive for diaphoresis and fatigue. Negative for activity change, fever and unexpected weight change. HENT: Negative for congestion, hearing loss, rhinorrhea and sore throat. Eyes: Negative for discharge.    Respiratory: Positive for cough, chest tightness and shortness of breath (on exertion). Cardiovascular: Negative for leg swelling. Gastrointestinal: Negative for abdominal pain, constipation and diarrhea. Genitourinary: Negative for dysuria, flank pain, frequency and urgency. Musculoskeletal: Positive for myalgias. Negative for arthralgias and back pain. Skin: Negative for color change and rash. Neurological: Negative for dizziness, light-headedness and headaches. Psychiatric/Behavioral: Negative for dysphoric mood and sleep disturbance. The patient is not nervous/anxious.          Patient-Reported Vitals 1/12/2021   Patient-Reported Weight 195   Patient-Reported Height 53   Patient-Reported Temperature 98   Patient-Reported Systolic  381   Patient-Reported Diastolic 80       Physical Exam    [INSTRUCTIONS:  \"[x]\" Indicates a positive item  \"[]\" Indicates a negative item  -- DELETE ALL ITEMS NOT EXAMINED]    Constitutional: [x] Appears well-developed and well-nourished [x] No apparent distress      [] Abnormal -     Mental status: [x] Alert and awake  [x] Oriented to person/place/time [x] Able to follow commands    [] Abnormal -     Eyes:   EOM    [x]  Normal    [] Abnormal -   Sclera  [x]  Normal    [] Abnormal -          Discharge [x]  None visible   [] Abnormal -     HENT: [x] Normocephalic, atraumatic  [] Abnormal -   [x] Mouth/Throat: Mucous membranes are moist    External Ears [x] Normal  [] Abnormal -    Neck: [x] No visualized mass [] Abnormal -     Pulmonary/Chest: [x] Respiratory effort normal   [x] No visualized signs of difficulty breathing or respiratory distress        [] Abnormal -      Musculoskeletal:   [x] Normal gait with no signs of ataxia         [x] Normal range of motion of neck        [] Abnormal -     Neurological:        [x] No Facial Asymmetry (Cranial nerve 7 motor function) (limited exam due to video visit)          [x] No gaze palsy        [] Abnormal -          Skin:        [x] No significant exanthematous lesions or discoloration noted on facial skin         [] Abnormal -            Psychiatric:       [x] Normal Affect [] Abnormal -        [x] No Hallucinations    Other pertinent observable physical exam findings:-    ASSESSMENT/PLAN:  1. Bronchitis due to COVID-19 virus  -     azithromycin (ZITHROMAX) 250 mg tablet; Take 2 tablets today, then take 1 tablet daily, Normal, Disp-6 Tab, R-0 sent to pharmacy. Potential side effects were discussed. -     benzonatate (TESSALON) 200 mg capsule; Take 1 Cap by mouth three (3) times daily as needed for Cough for up to 7 days. , Normal, Disp-21 Cap, R-0 sent to pharmacy. Potential side effects were discussed. I prescribed Tessalon to loosen up her secretions and suppress her cough. 2. Dyspnea on exertion  -     predniSONE (DELTASONE) 20 mg tablet; Prednisone 60 mg po x 2 days,40 mg po x 2 days,20 mg po x 1 day,10 mg po x 1 day then stop., Normal, Disp-12 Tab, R-0 sent to pharmacy. Potential side effects were discussed. I instructed her to always take prednisone with food. 3. Essential hypertension  -     amLODIPine (NORVASC) 5 mg tablet; Take 1 Tab by mouth daily for 30 days. , Normal, Disp-30 Tab, R-0 sent to pharmacy. Potential side effects were discussed. I prescribed amlodipine to manage her BP as I do not want it to be running to high and creating a risk for complications. 4. Excessive sweating  Sx of COVID-19. I instructed her to start taking Emergen-C every day. If Emergen-C is not available she should buy vitamin C 500 mg, vitamin D 1000 iu, and zinc salts. 5. Myalgia  Sx of COVID-19. I instructed her to stay hydrated and move around the house as she can. 6. Loss of smell  Sx of COVID-19. Will monitor for changes or improvements. This plan was reviewed with the patient and patient agrees. All questions were answered. This scribe documentation was reviewed by me and accurately reflects the examination and decisions made by me.     Gianna Loving is being evaluated by a Virtual Visit (video visit) encounter to address concerns as mentioned above. . Due to this being a TeleHealth encounter (During UEPDD-26 public health emergency), evaluation of the following organ systems was limited: Vitals/Constitutional/EENT/Resp/CV/GI//MS/Neuro/Skin/Heme-Lymph-Imm. Pursuant to the emergency declaration under the [address] [place] [address] and the [employer] Act, this Virtual Visit was conducted with patient's (and/or legal guardian's) consent, to reduce the patient's risk of exposure to COVID-19 and provide necessary medical care. The patient (and/or legal guardian) has also been advised to contact this office for worsening conditions or problems, and seek emergency medical treatment and/or call 911 if deemed necessary. Patient identification was verified at the start of the visit: YES    Services were provided through a video synchronous discussion virtually to substitute for in-person clinic visit. Patient was located at home and provider was located in office . An electronic signature was used to authenticate this note.   -- Vinay Garay

## 2021-05-04 ENCOUNTER — OFFICE VISIT (OUTPATIENT)
Dept: PRIMARY CARE CLINIC | Age: 61
End: 2021-05-04
Payer: COMMERCIAL

## 2021-05-04 VITALS
HEIGHT: 63 IN | TEMPERATURE: 98 F | OXYGEN SATURATION: 99 % | RESPIRATION RATE: 15 BRPM | HEART RATE: 68 BPM | BODY MASS INDEX: 37.49 KG/M2 | SYSTOLIC BLOOD PRESSURE: 137 MMHG | WEIGHT: 211.6 LBS | DIASTOLIC BLOOD PRESSURE: 86 MMHG

## 2021-05-04 DIAGNOSIS — Z99.89 OBSTRUCTIVE SLEEP APNEA ON CPAP: ICD-10-CM

## 2021-05-04 DIAGNOSIS — E66.9 OBESITY (BMI 30-39.9): ICD-10-CM

## 2021-05-04 DIAGNOSIS — E55.9 VITAMIN D DEFICIENCY: ICD-10-CM

## 2021-05-04 DIAGNOSIS — Z63.6 CAREGIVER BURDEN: ICD-10-CM

## 2021-05-04 DIAGNOSIS — G47.33 OBSTRUCTIVE SLEEP APNEA ON CPAP: ICD-10-CM

## 2021-05-04 DIAGNOSIS — G43.009 MIGRAINE WITHOUT AURA AND WITHOUT STATUS MIGRAINOSUS, NOT INTRACTABLE: ICD-10-CM

## 2021-05-04 DIAGNOSIS — Z00.00 PHYSICAL EXAM: Primary | ICD-10-CM

## 2021-05-04 LAB
25(OH)D3 SERPL-MCNC: 14.4 NG/ML (ref 30–100)
ALBUMIN SERPL-MCNC: 4.2 G/DL (ref 3.5–5)
ALBUMIN/GLOB SERPL: 1.1 {RATIO} (ref 1.1–2.2)
ALP SERPL-CCNC: 93 U/L (ref 45–117)
ALT SERPL-CCNC: 26 U/L (ref 12–78)
ANION GAP SERPL CALC-SCNC: 6 MMOL/L (ref 5–15)
AST SERPL-CCNC: 12 U/L (ref 15–37)
BILIRUB SERPL-MCNC: 0.3 MG/DL (ref 0.2–1)
BUN SERPL-MCNC: 20 MG/DL (ref 6–20)
BUN/CREAT SERPL: 28 (ref 12–20)
CALCIUM SERPL-MCNC: 10.5 MG/DL (ref 8.5–10.1)
CHLORIDE SERPL-SCNC: 107 MMOL/L (ref 97–108)
CHOLEST SERPL-MCNC: 241 MG/DL
CO2 SERPL-SCNC: 28 MMOL/L (ref 21–32)
CREAT SERPL-MCNC: 0.72 MG/DL (ref 0.55–1.02)
ERYTHROCYTE [DISTWIDTH] IN BLOOD BY AUTOMATED COUNT: 13 % (ref 11.5–14.5)
GLOBULIN SER CALC-MCNC: 3.7 G/DL (ref 2–4)
GLUCOSE SERPL-MCNC: 106 MG/DL (ref 65–100)
HCT VFR BLD AUTO: 42.8 % (ref 35–47)
HDLC SERPL-MCNC: 66 MG/DL
HDLC SERPL: 3.7 {RATIO} (ref 0–5)
HGB BLD-MCNC: 13.6 G/DL (ref 11.5–16)
LDLC SERPL CALC-MCNC: 133.6 MG/DL (ref 0–100)
LIPID PROFILE,FLP: ABNORMAL
MCH RBC QN AUTO: 28.2 PG (ref 26–34)
MCHC RBC AUTO-ENTMCNC: 31.8 G/DL (ref 30–36.5)
MCV RBC AUTO: 88.8 FL (ref 80–99)
NRBC # BLD: 0 K/UL (ref 0–0.01)
NRBC BLD-RTO: 0 PER 100 WBC
PLATELET # BLD AUTO: 250 K/UL (ref 150–400)
PMV BLD AUTO: 10.8 FL (ref 8.9–12.9)
POTASSIUM SERPL-SCNC: 5 MMOL/L (ref 3.5–5.1)
PROT SERPL-MCNC: 7.9 G/DL (ref 6.4–8.2)
RBC # BLD AUTO: 4.82 M/UL (ref 3.8–5.2)
SODIUM SERPL-SCNC: 141 MMOL/L (ref 136–145)
TRIGL SERPL-MCNC: 207 MG/DL (ref ?–150)
TSH SERPL DL<=0.05 MIU/L-ACNC: 1.28 UIU/ML (ref 0.36–3.74)
VLDLC SERPL CALC-MCNC: 41.4 MG/DL
WBC # BLD AUTO: 5.7 K/UL (ref 3.6–11)

## 2021-05-04 PROCEDURE — 99396 PREV VISIT EST AGE 40-64: CPT | Performed by: INTERNAL MEDICINE

## 2021-05-04 SDOH — SOCIAL STABILITY - SOCIAL INSECURITY: DEPENDENT RELATIVE NEEDING CARE AT HOME: Z63.6

## 2021-05-04 NOTE — PROGRESS NOTES
Chief Complaint   Patient presents with    Physical     annual    States that the allergie of Tylenol-codenine needs to be removed- not allergic    Visit Vitals  BP (!) 146/84 (BP 1 Location: Right arm)   Pulse 68   Temp 98 °F (36.7 °C)   Resp 15   Ht 5' 3\" (1.6 m)   Wt 211 lb 9.6 oz (96 kg)   SpO2 99%   BMI 37.48 kg/m²       1. Have you been to the ER, urgent care clinic since your last visit? Hospitalized since your last visit? No    2. Have you seen or consulted any other health care providers outside of the [address] [place] [address] since your last visit? Include any pap smears or colon screening.  No

## 2021-05-04 NOTE — PROGRESS NOTES
Ian Casanova (: 1960) is a 62 y.o. female, established patient, here for evaluation of the following chief complaint(s):  Physical (annual )     Written by Darylene Neve, as dictated by Dr. Nate Parikh MD.    ASSESSMENT/PLAN:  Below is the assessment and plan developed based on review of pertinent history, physical exam, labs, studies, and medications. 1. Physical exam  -     METABOLIC PANEL, COMPREHENSIVE; Future  -     CBC W/O DIFF; Future  -     LIPID PANEL; Future  -     TSH 3RD GENERATION; Future   Complete physical exam done. Basic labs drawn. I stressed the importance of getting the COVID vaccine as she will lose her immunity 90 days after getting sick and she is still vulnerable to new variants. 2. Obesity (BMI 30-39. 9)  I commended the pt on her healthy lifestyle choices and routines. Explained that 5,000 steps are needed daily for healthy lifestyle and to maintain conditioning, and she will need to increase to 10,000 a day to work on weight loss. 3. Obstructive sleep apnea on CPAP  Stable, continues on CPAP. 4. Migraine without aura and without status migrainosus, not intractable  Stable, she has not been getting many migraines recently. 5. Vitamin D deficiency  -     VITAMIN D, 25 HYDROXY; Future  Check vitamin D, she has not been taking a supplement every day. 6. Caregiver burden  She has stress at home from caring for her mother who is on hospice. SUBJECTIVE/OBJECTIVE:  HPI  The patient comes in today for a complete physical examination and labs. She is not taking a vitamin D supplement consistently, her vitamin D came back low at 19.4 on 19. Her mother is currently living with her and is on hospice care currently. She has a caregiver take care of her mother during the day while she is at work, but at night she is the caregiver. She notes that she is unable to lose weight even when restricting her diet.  She is not getting very many steps during the day d/t working full time and caregiver burden at home. She is hesitant to get the COVID vaccine as she is scared of what she has been reading in the news. She was pretty sick when she first had COVID and still has not regained her sense of smell. She has some chest tightness on exertion. Patient Active Problem List   Diagnosis Code    Herniated disc [id number]    Migraine without aura and without status migrainosus, not intractable G43.009    Obstructive sleep apnea on CPAP G47.33, Z99.89    Esophageal spasm K22.4        Current Outpatient Medications on File Prior to Visit   Medication Sig Dispense Refill    albuterol (ProAir HFA) 90 mcg/actuation inhaler Take 1 Puff by inhalation every six (6) hours as needed for Wheezing. TAKE 1 PUFF BY INHALATION EVERY SIX (6) HOURS AS NEEDED FOR WHEEZING FOR UP TO 30 DAYS 8.5 Inhaler 0    amLODIPine (NORVASC) 5 mg tablet TAKE 1 TABLET BY MOUTH EVERY DAY 90 Tab 0    [DISCONTINUED] predniSONE (DELTASONE) 20 mg tablet Prednisone 60 mg po x 2 days,40 mg po x 2 days,20 mg po x 1 day,10 mg po x 1 day then stop. 12 Tab 0    estradiol (ESTRACE) 1 mg tablet Take 1 Tab by mouth daily. No current facility-administered medications on file prior to visit.         Allergies   Allergen Reactions    Tylenol-Codeine #3 [Acetaminophen-Codeine] Other (comments)     Vomitting, and upset stomach state that she is not allergic    Iodinated Contrast Media Hives       Past Medical History:   Diagnosis Date    Asthma     GERD (gastroesophageal reflux disease)     Headache     Nutcracker esophagus 2011       Past Surgical History:   Procedure Laterality Date    ENDOSCOPY, COLON, DIAGNOSTIC      HX GYN      HX HEENT      HX LUMBAR DISKECTOMY  9/5/2013    SD ABDOMEN SURGERY PROC UNLISTED         Family History   Problem Relation Age of Onset    Cancer Mother     Heart Disease Father     Breast Cancer Maternal Grandmother     Breast Cancer Paternal Grandmother 54    Breast Cancer Maternal Aunt     Breast Cancer Paternal Aunt        Social History     Socioeconomic History    Marital status:      Spouse name: Not on file    Number of children: Not on file    Years of education: Not on file    Highest education level: Not on file   Occupational History    Not on file   Social Needs    Financial resource strain: Not on file    Food insecurity     Worry: Not on file     Inability: Not on file    Transportation needs     Medical: Not on file     Non-medical: Not on file   Tobacco Use    Smoking status: Never Smoker    Smokeless tobacco: Never Used   Substance and Sexual Activity    Alcohol use: Yes     Comment: occasional    Drug use: No    Sexual activity: Yes     Partners: Male   Lifestyle    Physical activity     Days per week: Not on file     Minutes per session: Not on file    Stress: Not on file   Relationships    Social connections     Talks on phone: Not on file     Gets together: Not on file     Attends Sabianism service: Not on file     Active member of club or organization: Not on file     Attends meetings of clubs or organizations: Not on file     Relationship status: Not on file    Intimate partner violence     Fear of current or ex partner: Not on file     Emotionally abused: Not on file     Physically abused: Not on file     Forced sexual activity: Not on file   Other Topics Concern    Not on file   Social History Narrative    Not on file       No visits with results within 3 Month(s) from this visit.    Latest known visit with results is:   Office Visit on 08/28/2019   Component Date Value Ref Range Status    Glucose 08/28/2019 91  65 - 99 mg/dL Final    BUN 08/28/2019 11  6 - 24 mg/dL Final    Creatinine 08/28/2019 0.71  0.57 - 1.00 mg/dL Final    GFR est non-AA 08/28/2019 95  >59 mL/min/1.73 Final    GFR est AA 08/28/2019 109  >59 mL/min/1.73 Final    BUN/Creatinine ratio 08/28/2019 15  9 - 23 Final    Sodium 08/28/2019 139  134 - 144 mmol/L Final    Potassium 08/28/2019 3.9  3.5 - 5.2 mmol/L Final    Chloride 08/28/2019 102  96 - 106 mmol/L Final    CO2 08/28/2019 22  20 - 29 mmol/L Final    Calcium 08/28/2019 10.1  8.7 - 10.2 mg/dL Final    Protein, total 08/28/2019 7.8  6.0 - 8.5 g/dL Final    Albumin 08/28/2019 4.8  3.5 - 5.5 g/dL Final    GLOBULIN, TOTAL 08/28/2019 3.0  1.5 - 4.5 g/dL Final    A-G Ratio 08/28/2019 1.6  1.2 - 2.2 Final    Bilirubin, total 08/28/2019 0.3  0.0 - 1.2 mg/dL Final    Alk. phosphatase 08/28/2019 75  39 - 117 IU/L Final    AST (SGOT) 08/28/2019 21  0 - 40 IU/L Final    ALT (SGPT) 08/28/2019 21  0 - 32 IU/L Final    WBC 08/28/2019 7.0  3.4 - 10.8 x10E3/uL Final    RBC 08/28/2019 4.72  3.77 - 5.28 x10E6/uL Final    HGB 08/28/2019 13.6  11.1 - 15.9 g/dL Final    HCT 08/28/2019 40.2  34.0 - 46.6 % Final    MCV 08/28/2019 85  79 - 97 fL Final    MCH 08/28/2019 28.8  26.6 - 33.0 pg Final    MCHC 08/28/2019 33.8  31.5 - 35.7 g/dL Final    RDW 08/28/2019 13.7  12.3 - 15.4 % Final    PLATELET 36/18/2384 008  150 - 450 x10E3/uL Final    Cholesterol, total 08/28/2019 230* 100 - 199 mg/dL Final    Triglyceride 08/28/2019 202* 0 - 149 mg/dL Final    HDL Cholesterol 08/28/2019 61  >39 mg/dL Final    VLDL, calculated 08/28/2019 40  5 - 40 mg/dL Final    LDL, calculated 08/28/2019 129* 0 - 99 mg/dL Final    TSH 08/28/2019 1.100  0.450 - 4.500 uIU/mL Final    VITAMIN D, 25-HYDROXY 08/28/2019 19.4* 30.0 - 100.0 ng/mL Final    Comment: Vitamin D deficiency has been defined by the [address] practice guideline as a  level of serum 25-OH vitamin D less than 20 ng/mL (1,2). The Endocrine Society went on to further define vitamin D  insufficiency as a level between 21 and 29 ng/mL (2). 1. IOM ([place] of Medicine). 2010. Dietary reference     intakes for calcium and D. [facility]: The     [employer].   2. Mandi BAER, Marilee ORR, Suzan LEA et al.     Evaluation, treatment, and prevention of vitamin D     deficiency: an Endocrine Society clinical practice     guideline. JCEM. 2011 Jul; 96(7):1911-30.  Antinuclear Abs, IFA 08/28/2019 Negative   Final    Comment:                                      Negative   <1:80                                       Borderline  1:80                                       Positive   >1:80      Please note 08/28/2019 Comment   Final    Comment: ARLETTE Multiplex methodology was designed to detect up to 11 antibodies  of the 100+ antibodies that may be detected by ARLETTE IFA methodology.  C-Reactive Protein, Cardiac 08/28/2019 4.71* 0.00 - 3.00 mg/L Final    Comment:          Relative Risk for Future Cardiovascular Event                               Low                 <1.00                               Average       1.00 - 3.00                               High                >3.00       Review of Systems   Constitutional: Negative for activity change, fatigue and unexpected weight change. HENT: Negative for congestion, hearing loss, rhinorrhea and sore throat. Eyes: Negative for discharge. Respiratory: Negative for cough, chest tightness and shortness of breath. Cardiovascular: Negative for leg swelling. Gastrointestinal: Negative for abdominal pain, constipation and diarrhea. Genitourinary: Negative for dysuria, flank pain, frequency and urgency. Musculoskeletal: Negative for arthralgias, back pain and myalgias. Skin: Negative for color change and rash. Neurological: Negative for dizziness, light-headedness and headaches. Psychiatric/Behavioral: Negative for dysphoric mood and sleep disturbance. The patient is not nervous/anxious.          +caregiver stress     Visit Vitals  /86 (BP 1 Location: Left upper arm, BP Patient Position: Sitting)   Pulse 68   Temp 98 °F (36.7 °C)   Resp 15   Ht 5' 3\" (1.6 m)   Wt 211 lb 9.6 oz (96 kg)   SpO2 99%   BMI 37.48 kg/m² Physical Exam  Vitals signs and nursing note reviewed. Constitutional:       General: She is not in acute distress. Appearance: Normal appearance. She is well-developed. She is obese. She is not diaphoretic. HENT:      Right Ear: External ear normal.      Left Ear: External ear normal.   Eyes:      General: No scleral icterus. Right eye: No discharge. Left eye: No discharge. Extraocular Movements: Extraocular movements intact. Conjunctiva/sclera: Conjunctivae normal.   Neck:      Musculoskeletal: Normal range of motion and neck supple. Cardiovascular:      Rate and Rhythm: Normal rate and regular rhythm. Pulmonary:      Effort: Pulmonary effort is normal.      Breath sounds: Normal breath sounds. No wheezing. Abdominal:      General: Bowel sounds are normal.      Palpations: Abdomen is soft. Tenderness: There is no abdominal tenderness. Lymphadenopathy:      Cervical: No cervical adenopathy. Neurological:      Mental Status: She is alert and oriented to person, place, and time. Psychiatric:         Mood and Affect: Mood and affect normal.         An electronic signature was used to authenticate this note.   -- Reji Milner

## 2021-05-05 NOTE — PROGRESS NOTES
Kenzie Thomas, it was nice seeing you in the office. Your blood report is back & Cholesterol numbers came back high. Vitamin D came low. I would suggest going on low carb diet ( cutting down on pasta, Rice & bread). We did talk about physical activity part during an appointment. Please start taking Vitamin D3 2000 I.U daily dose. Will repeat labs in 6 months if no improvement in cholesterol numbers will discuss medication options.

## 2021-08-26 ENCOUNTER — OFFICE VISIT (OUTPATIENT)
Dept: SLEEP MEDICINE | Age: 61
End: 2021-08-26
Payer: COMMERCIAL

## 2021-08-26 VITALS
DIASTOLIC BLOOD PRESSURE: 78 MMHG | HEIGHT: 63 IN | BODY MASS INDEX: 36.86 KG/M2 | OXYGEN SATURATION: 98 % | SYSTOLIC BLOOD PRESSURE: 126 MMHG | RESPIRATION RATE: 18 BRPM | WEIGHT: 208 LBS | HEART RATE: 74 BPM

## 2021-08-26 DIAGNOSIS — E66.9 OBESITY (BMI 30-39.9): ICD-10-CM

## 2021-08-26 DIAGNOSIS — G47.33 OSA (OBSTRUCTIVE SLEEP APNEA): Primary | ICD-10-CM

## 2021-08-26 PROCEDURE — 99204 OFFICE O/P NEW MOD 45 MIN: CPT | Performed by: SPECIALIST

## 2021-08-26 NOTE — PROGRESS NOTES
Chief Complaint   Patient presents with    Sleep Problem     NP; on PAP, device on recall; seen by [address] [place] [address]

## 2021-08-26 NOTE — PROGRESS NOTES
[address] [place]. [place], [address]  Tel.  [phone]  Fax. [address] [facility] [address]  [place], [address] [facility]  Tel.  [phone]  Fax. [phone] [address] [facility] [place]  Tel.  [phone]  Fax. [phone]       Chief Complaint       Chief Complaint   Patient presents with    Sleep Problem     NP; on PAP, device on recall; seen by [address] [facility]      Erik Skiff is 62 y.o. female seen for evaluation of a sleep disorder. Patient notes having had initial evaluation a number of years ago at  \" [facility]\" at which time she was diagnosed with sleep apnea. Details in this regard are not available. She was started on CPAP. She did not have formal compliance follow-up during this time. Continue CPAP at 13 cm. She uses CPAP 30-30 days with average use of 8.3 hours. AHI variable; approximately 12.5/h. She notes difficulty with CPAP. Noted to have loud snoring, apparent bruxism, tongue biting. [place] Sleepiness Score: 2       Allergies   Allergen Reactions    Tylenol-Codeine #3 [Acetaminophen-Codeine] Other (comments)     Vomitting, and upset stomach state that she is not allergic    Iodinated Contrast Media Hives       Current Outpatient Medications   Medication Sig Dispense Refill    albuterol (ProAir HFA) 90 mcg/actuation inhaler Take 1 Puff by inhalation every six (6) hours as needed for Wheezing. TAKE 1 PUFF BY INHALATION EVERY SIX (6) HOURS AS NEEDED FOR WHEEZING FOR UP TO 30 DAYS 8.5 Inhaler 0    amLODIPine (NORVASC) 5 mg tablet TAKE 1 TABLET BY MOUTH EVERY DAY 90 Tab 0    estradiol (ESTRACE) 1 mg tablet Take 1 Tab by mouth daily. She  has a past medical history of Asthma, GERD (gastroesophageal reflux disease), Headache, and Nutcracker esophagus (2011).     She  has a past surgical history that includes pr abdomen surgery proc unlisted; hx heent; endoscopy, colon, diagnostic; hx gyn; and hx lumbar diskectomy (9/5/2013). She family history includes Breast Cancer in her maternal aunt, maternal grandmother, and paternal aunt; Breast Cancer (age of onset: 54) in her paternal grandmother; Cancer in her mother; Heart Disease in her father. She  reports that she has never smoked. She has never used smokeless tobacco. She reports current alcohol use. She reports that she does not use drugs. Review of Systems:  Review of Systems   Constitutional: Negative for chills and fever. HENT: Negative for hearing loss and tinnitus. Eyes: Negative for blurred vision and double vision. Respiratory: Negative for cough and shortness of breath. Cardiovascular: Negative for chest pain and palpitations. Gastrointestinal: Positive for abdominal pain. Genitourinary: Negative for frequency and urgency. Musculoskeletal: Negative for back pain and neck pain. Skin: Negative for itching and rash. Neurological: Negative for dizziness and headaches. Psychiatric/Behavioral: Negative for depression. The patient is not nervous/anxious. Objective:     Visit Vitals  /78   Pulse 74   Resp 18   Ht 5' 3\" (1.6 m)   Wt 208 lb (94.3 kg)   SpO2 98%   BMI 36.85 kg/m²     Body mass index is 36.85 kg/m². General:   Conversant, cooperative   Eyes:  Pupils equal and reactive, no nystagmus   Oropharynx:   Mallampati scoreI, tongue scalloped       Neck:   No carotid bruits; Neck circ. in \"inches\": 16   Chest/Lungs:  Clear on auscultation    CVS:  Normal rate, regular rhythm, trace distal edema   Skin:  Warm to touch; no obvious rashes   Neuro:  Speech fluent, face symmetrical, tongue movement normal   Psych:  Normal affect,  normal countenance        Assessment:       ICD-10-CM ICD-9-CM    1. COREY (obstructive sleep apnea)  G47.33 327.23 SPLIT CPAP/PSG      NOVEL CORONAVIRUS (COVID-19)      NOVEL CORONAVIRUS (COVID-19)   2. Obesity (BMI 30-39. 9)  E66.9 278.00 SPLIT CPAP/PSG      NOVEL CORONAVIRUS (COVID-19)      NOVEL CORONAVIRUS (COVID-19)     History of obstructive sleep apnea, not controlled with CPAP. Initial evaluation unavailable. She will be reevaluated with a sleep study, split to BiPAP. Plan:     Orders Placed This Encounter    NOVEL CORONAVIRUS (COVID-19)     Standing Status:   Future     Number of Occurrences:   1     Standing Expiration Date:   2/26/2022     Scheduling Instructions:      1) Due to current limited availability of the COVID-19 PCR test, tests will be prioritized and may not be completed.              2) Order only if the test result will change clinical management or necessary for a return to mission-critical employment decision.              3) Print and instruct patient to adhere to CDC home isolation program. (Link Above)              4) Set up or refer patient for a monitoring program.              5) Have patient sign up for and leverage [employer] (if not previously done). Order Specific Question:   Is this test for diagnosis or screening? Answer:   Screening     Order Specific Question:   Symptomatic for COVID-19 as defined by CDC? Answer:   No     Order Specific Question:   Hospitalized for COVID-19? Answer:   No     Order Specific Question:   Admitted to ICU for COVID-19? Answer:   No     Order Specific Question:   Employed in healthcare setting? Answer:   No     Order Specific Question:   Resident in a congregate (group) care setting? Answer:   No     Order Specific Question:   Pregnant? Answer:   No     Order Specific Question:   Previously tested for COVID-19? Answer: Yes    SPLIT CPAP/PSG     Split to BIPAP     Standing Status:   Future     Standing Expiration Date:   2/26/2022     Order Specific Question:   Reason for Exam     Answer:   history of sleep apnea       * Patient has a history and examination consistent with the diagnosis of sleep apnea. * Sleep testing was ordered for reevaluation.   Study to be split to BiPAP. * She was provided information on sleep apnea including corresponding risk factors and the importance of proper treatment. * Treatment options if indicated were reviewed today. Instructions:    o The patient would benefit from weight reduction measures. o Do not engage in activities requiring a normal degree of alertness if fatigue is present. o The patient understands that untreated or undertreated sleep apnea could impair judgement and the ability to function normally during the day.  o Call or return if symptoms worsen or persist.          Josselin Carter MD, FAA  Electronically signed 08/26/21       This note was created using voice recognition software. Despite editing, there may be syntax errors. This note will not be viewable in [address]

## 2021-09-21 ENCOUNTER — OFFICE VISIT (OUTPATIENT)
Dept: SLEEP MEDICINE | Age: 61
End: 2021-09-21

## 2021-09-21 DIAGNOSIS — G47.33 OSA (OBSTRUCTIVE SLEEP APNEA): Primary | ICD-10-CM

## 2021-09-23 ENCOUNTER — HOSPITAL ENCOUNTER (OUTPATIENT)
Dept: SLEEP MEDICINE | Age: 61
Discharge: HOME | End: 2021-09-23
Attending: SPECIALIST
Payer: COMMERCIAL

## 2021-09-23 LAB
SARS-COV-2, NAA 2 DAY TAT: NORMAL
SARS-COV-2, NAA: NOT DETECTED

## 2021-09-23 PROCEDURE — 95810 POLYSOM 6/> YRS 4/> PARAM: CPT | Performed by: SPECIALIST

## 2021-09-24 ENCOUNTER — DOCUMENTATION ONLY (OUTPATIENT)
Dept: SLEEP MEDICINE | Age: 61
End: 2021-09-24

## 2021-09-24 NOTE — PROGRESS NOTES
[address] [place]. [place], [address]  Tel.  [phone]  Fax. [address] [facility]  [address] [facility] [address], [address] [facility]  Tel.  [phone]  Fax. [phone] [address] [facility] [place]  Tel.  [phone]  Fax. [phone]     Sleep Study Technical Notes        PRE-Test:  Lilian Baker (: 1960) arrived in the lobby. Patient was greeted, temperature checked (98.6 ) and screening questions asked. The patient was taken to the Sleep Center and taken directly to his/her room. BP (135/80) and SaO2 (98) were taken. Weight per patient (207). Procedure explained to the patient and questions were answered. The patient expressed understanding of the procedure. Electrodes were applied without incident. The patient was placed in bed and the study was started.  PAP mask acclimation for **min. Patient did/didn't tolerate mask. Acquisition Notes:   Lights off: 9:45pm     Respiratory events: Apnea, Hypopnea   ECG:  NSR   Snoring:  Severe mild/moderate/severe   PAP titration: Criteria was not met   Desensitization Mask(s) Used: N/A   Other comments: Patient slept supine, left and right side. Sleep stages 1, 2, 3 and rem noted. Apnea / hypopnea observed. Severe snore. Bi-pap was not initiated as criteria was not met. Patient awakened shortly before 5am stating she would not be able to sleep any longer. Patient c/o chest discomfort at this time. o Patient had caregiver in attendance:  Y/N  o Patient watched TV or on phone after lights out for ** hours  o Patient slept with positional therapy:  Y/N  o Patient slept with head of bed elevated:  o Patient wore an oral appliance:  Y/N  o Patient to bathroom 3 times  o Pediatric Patient:  - Parent accompanied patient:  Y/N  - Parent slept in bed with patient:  Y/N      POST Test:   Patient was awakened. Electrodes were removed. The patient was discharged after answering the Post Questionnaire.   Patient stated that he/she was alert and ok to drive.  Equipment and room cleaned per infection control policy.

## 2021-09-30 ENCOUNTER — TELEPHONE (OUTPATIENT)
Dept: SLEEP MEDICINE | Age: 61
End: 2021-09-30

## 2021-09-30 DIAGNOSIS — G47.33 OSA (OBSTRUCTIVE SLEEP APNEA): Primary | ICD-10-CM

## 2021-09-30 NOTE — TELEPHONE ENCOUNTER
Sleep study performed. Patient with history of poor CPAP tolerance. 449.5 minutes recorded which 249.5 spent asleep with a sleep efficiency of 55.5%. Sleep onset at 39 minutes; REM onset at 272 minutes with total REM representing 16.8% of sleep time. All sleep stages were observed. 153 respiratory events occurred of which 143 were hypopnea and 10 apnea (1 central, 9 obstructive). The overall AHI was 36.8/h. Minimal SaO2 81%. Severe snoring noted. Impression: Severe sleep disordered breathing. Patient with history of poor CPAP tolerance. BiPAP titration study will be obtained. Study was not split due to reduced sleep efficiency during the initial portion of the recording. Sleep technologist: Please advise patient of PSG results. Order has been entered for BiPAP titration study, Covid testing.

## 2021-10-04 NOTE — TELEPHONE ENCOUNTER
Reviewed sleep study results with patient. She expressed understanding and is willing to proceed with a BiPAP Titration.

## 2021-11-01 ENCOUNTER — OFFICE VISIT (OUTPATIENT)
Dept: SLEEP MEDICINE | Age: 61
End: 2021-11-01

## 2021-11-01 DIAGNOSIS — G47.33 OSA (OBSTRUCTIVE SLEEP APNEA): Primary | ICD-10-CM

## 2021-11-03 LAB
SARS-COV-2, NAA 2 DAY TAT: NORMAL
SARS-COV-2, NAA: NOT DETECTED

## 2021-11-04 ENCOUNTER — HOSPITAL ENCOUNTER (OUTPATIENT)
Dept: SLEEP MEDICINE | Age: 61
Discharge: HOME | End: 2021-11-04
Payer: COMMERCIAL

## 2021-11-04 DIAGNOSIS — G47.33 OSA (OBSTRUCTIVE SLEEP APNEA): ICD-10-CM

## 2021-11-04 PROCEDURE — 95811 POLYSOM 6/>YRS CPAP 4/> PARM: CPT | Performed by: SPECIALIST

## 2021-11-05 ENCOUNTER — DOCUMENTATION ONLY (OUTPATIENT)
Dept: SLEEP MEDICINE | Age: 61
End: 2021-11-05

## 2021-11-05 VITALS
HEIGHT: 63 IN | TEMPERATURE: 98.4 F | WEIGHT: 207 LBS | HEART RATE: 73 BPM | BODY MASS INDEX: 36.68 KG/M2 | SYSTOLIC BLOOD PRESSURE: 141 MMHG | OXYGEN SATURATION: 97 % | DIASTOLIC BLOOD PRESSURE: 89 MMHG

## 2021-11-05 NOTE — PROGRESS NOTES
[address] [facility] [place]. [place], [address]  Tel.  [phone]  Fax. [address] [facility]  [place], [address] [facility]  Tel.  [phone]  Fax. [phone] [address] [place]  Tel.  [phone]  Fax. [phone]     Sleep Study Technical Notes        PRE-Test:  Archie Dietrich (: 1960) arrived in the lobby. Patient was greeted, temperature checked (98.4 ) and screening questions asked. The patient was taken to the Sleep Center and taken directly to his/her room. BP (141/89) and SaO2 (97%) were taken. Weight per patient (207lb). Procedure explained to the patient and questions were answered. The patient expressed understanding of the procedure. Electrodes were applied without incident. The patient was placed in bed and the study was started.  PAP mask acclimation for **min. Patient did tolerate mask. Acquisition Notes:   Lights off: 10:48pm     Respiratory events: sofia, hypopnea, central apnea,  RR 14-18   ECG:  NSR, 56- 70 bpm   Snoring:  No   PAP titration: BIPAP  TITRATION Starting 8/4 titrated to 13/8cm   Desensitization Mask(s) Used: RESPIRONIC DREAMWEAR FF SIZE (M)   Other comments: PT slept well, PT had couple apnea while in REM sleep,PT woke up feel better  o Patient had caregiver in attendance:  N  o Patient watched TV or on phone after lights out for ** hours  o Patient slept with positional therapy:  Y  o Patient slept with head of bed elevated:  N  o Patient wore an oral appliance:  N  o Patient to bathroom 1 times  o Pediatric Patient:  - Parent accompanied patient: N  - Parent slept in bed with patient: N      POST Test:   Patient was awakened. Electrodes were removed. The patient was discharged after answering the Post Questionnaire. Patient stated that she was alert and ok to drive.  Equipment and room cleaned per infection control policy.

## 2021-11-18 ENCOUNTER — TELEPHONE (OUTPATIENT)
Dept: SLEEP MEDICINE | Age: 61
End: 2021-11-18

## 2021-11-18 ENCOUNTER — DOCUMENTATION ONLY (OUTPATIENT)
Dept: SLEEP MEDICINE | Age: 61
End: 2021-11-18

## 2021-11-18 DIAGNOSIS — G47.33 OSA (OBSTRUCTIVE SLEEP APNEA): Primary | ICD-10-CM

## 2021-11-18 NOTE — TELEPHONE ENCOUNTER
BiPAP titration study performed. Patient with history of sleep apnea with poor CPAP tolerance. Reevaluation demonstrated 153 respiratory events of which 143 were hypopnea and 10 apnea (1 central, 9 obstructive). The overall AHI was 36.8/h. Minimal SaO2 81%. Severe snoring noted. 409 minutes recorded of which 282.5 minutes spent asleep with a sleep efficiency of 69.1%. Sleep onset at 29.3 minutes; REM onset at 198.5 minutes with total REM representing 20.7% of sleep time. 19 respiratory events occurred of which 12 hypopnea and 7 obstructive apnea. The overall AHI was 4/h. BiPAP initiated at 8/4 cm and increased to 13/8 cm. At 13/8 cm: 156.4 minutes recorded of which 105.9 minutes spent asleep and 37.9 minutes in rem. AHI 2.3/h. Minimal SaO2 89%. Impression: Sleep disordered breathing responding to BiPAP 13/8 cm    Results reviewed with the patient. BiPAP will be started at 13/8 cm. Compliance appointment will be scheduled.

## 2021-11-18 NOTE — PROGRESS NOTES
Faxed PAP order to medical equipment company. Patient informed and will call back to schedule adherence once she receives her machine.

## 2021-11-23 ENCOUNTER — TELEPHONE (OUTPATIENT)
Dept: SLEEP MEDICINE | Age: 61
End: 2021-11-23

## 2021-11-23 ENCOUNTER — DOCUMENTATION ONLY (OUTPATIENT)
Dept: SLEEP MEDICINE | Age: 61
End: 2021-11-23

## 2021-11-23 DIAGNOSIS — G47.33 OSA (OBSTRUCTIVE SLEEP APNEA): Primary | ICD-10-CM

## 2021-11-24 ENCOUNTER — DOCUMENTATION ONLY (OUTPATIENT)
Dept: SLEEP MEDICINE | Age: 61
End: 2021-11-24

## 2021-12-21 ENCOUNTER — DOCUMENTATION ONLY (OUTPATIENT)
Dept: SLEEP MEDICINE | Age: 61
End: 2021-12-21

## 2021-12-21 NOTE — PROGRESS NOTES
Because [employer] [facility] did not know how long it might be for patient to be set up (may take up to a year), order faxed to [facility]. Patient informed. She will call back to schedule 1st adh after she gets device.

## 2022-01-04 DIAGNOSIS — R07.89 CHEST TIGHTNESS: ICD-10-CM

## 2022-01-04 DIAGNOSIS — J45.20 ALLERGY-INDUCED ASTHMA, MILD INTERMITTENT, UNCOMPLICATED: ICD-10-CM

## 2022-01-05 RX ORDER — ALBUTEROL SULFATE 90 UG/1
1 AEROSOL, METERED RESPIRATORY (INHALATION)
Qty: 1 EACH | Refills: 0 | Status: SHIPPED | OUTPATIENT
Start: 2022-01-05 | End: 2022-01-30

## 2022-01-05 NOTE — TELEPHONE ENCOUNTER
Requested Prescriptions     Pending Prescriptions Disp Refills    albuterol (ProAir HFA) 90 mcg/actuation inhaler 1 Each 0     Sig: Take 1 Puff by inhalation every six (6) hours as needed for Wheezing.  TAKE 1 PUFF BY INHALATION EVERY SIX (6) HOURS AS NEEDED FOR WHEEZING FOR UP TO 30 DAYS        Last Visit 5/4/21  Last Refill 1/12/21

## 2022-01-29 DIAGNOSIS — J45.20 ALLERGY-INDUCED ASTHMA, MILD INTERMITTENT, UNCOMPLICATED: ICD-10-CM

## 2022-01-29 DIAGNOSIS — R07.89 CHEST TIGHTNESS: ICD-10-CM

## 2022-01-30 RX ORDER — ALBUTEROL SULFATE 90 UG/1
1 AEROSOL, METERED RESPIRATORY (INHALATION)
Qty: 8.5 EACH | Refills: 1 | Status: SHIPPED | OUTPATIENT
Start: 2022-01-30

## 2022-02-08 NOTE — PROGRESS NOTES
Order for BiPAP has IPAP / EPAP pressures switched. Will have order amended to display correctly. Rectal temp probe placed in pt to monitor temp. Pt tolerated well.

## 2022-02-28 NOTE — PROGRESS NOTES
-- DO NOT REPLY / DO NOT REPLY ALL --  -- Message is from the [facility]--    Patient is requesting a medication refill - medication is on active medication list    Patient is currently OUT of the requested medication.  Patient states she on her last pen and it will last only 8 days for her.    Patient had scheduled appointment with Dr. Ez Serrano, 11/10/2022 at 10:50am.    Please notify patient once prescription is send to pharmacy or if there is any problem getting the medication.    Was Medication Pended?  Yes.    Rx Name and Dose:    insulin detemir (LEVEMIR FLEXTOUCH) 100 UNIT/ML pen-injector; Inject 30 units into the skin daily    Duration: 90 days    Pharmacy  [id number] - [place], [place] - [facility]    Patient confirmed the above pharmacy as correct?  Yes    Does this request need an existing or new prescription at a pharmacy to be sent to a new pharmacy location?   No    Caller Information       Type Contact Phone    02/28/2022 02:35 PM CST Phone (Incoming) Dilcia Norris (Self) [phone] (M)          Alternative phone number: [phone] (Home Phone)    Turnaround time given to caller:   \"This message will be sent to [state Provider's name]. The clinical team will fulfill your request as soon as they review your message.\"   Pt here for chronic fatigue, pt states that her body feels constantly drained and is gaining weight despite her working out 3-4 times weekly. She states that she is really frustrated with what is going on since she is gaining weight even with working out. Chief Complaint   Patient presents with    Fatigue     Visit Vitals    /84 (BP 1 Location: Left arm, BP Patient Position: Sitting)    Pulse 73    Temp 98.1 °F (36.7 °C) (Oral)    Resp 17    Ht 5' 3\" (1.6 m)    Wt 205 lb 6.4 oz (93.2 kg)    SpO2 98%    BMI 36.38 kg/m2      1. Have you been to the ER, urgent care clinic since your last visit? Hospitalized since your last visit? No    2. Have you seen or consulted any other health care providers outside of the [address] [place] [address] since your last visit? Include any pap smears or colon screening.  No

## 2022-03-02 ENCOUNTER — OFFICE VISIT (OUTPATIENT)
Dept: SLEEP MEDICINE | Age: 62
End: 2022-03-02
Payer: COMMERCIAL

## 2022-03-02 VITALS
DIASTOLIC BLOOD PRESSURE: 80 MMHG | WEIGHT: 203 LBS | OXYGEN SATURATION: 98 % | HEART RATE: 79 BPM | SYSTOLIC BLOOD PRESSURE: 130 MMHG | BODY MASS INDEX: 35.97 KG/M2 | HEIGHT: 63 IN

## 2022-03-02 DIAGNOSIS — E66.01 SEVERE OBESITY (BMI 35.0-39.9) WITH COMORBIDITY (HCC): ICD-10-CM

## 2022-03-02 DIAGNOSIS — G47.33 OSA (OBSTRUCTIVE SLEEP APNEA): Primary | ICD-10-CM

## 2022-03-02 PROCEDURE — 99213 OFFICE O/P EST LOW 20 MIN: CPT | Performed by: SPECIALIST

## 2022-03-02 NOTE — PATIENT INSTRUCTIONS
Sleep Apnea: Care Instructions  Overview     Sleep apnea means that you frequently stop breathing for 10 seconds or longer during sleep. It can be mild to severe, based on the number of times an hour that you stop breathing. Blocked or narrowed airways in your nose, mouth, or throat can cause sleep apnea. Your airway can become blocked when your throat muscles and tongue relax during sleep. You can help treat sleep apnea at home by making lifestyle changes. You also can use a CPAP breathing machine that keeps tissues in the throat from blocking your airway. Or your doctor may suggest that you use a breathing device while you sleep. It helps keep your airway open. This could be a device that you put in your mouth. In some cases, surgery may be needed to remove enlarged tissues in the throat. Follow-up care is a key part of your treatment and safety. Be sure to make and go to all appointments, and call your doctor if you are having problems. It's also a good idea to know your test results and keep a list of the medicines you take. How can you care for yourself at home? · Lose weight, if needed. · Sleep on your side. It may help mild apnea. · Avoid alcohol and medicines such as sleeping pills, opioids, or sedatives before bed. · Don't smoke. If you need help quitting, talk to your doctor. · Prop up the head of your bed. · Treat breathing problems, such as a stuffy nose, that are caused by a cold or allergies. · Try a continuous positive airway pressure (CPAP) breathing machine if your doctor recommends it. · If CPAP doesn't work for you, ask your doctor if you can try other masks, settings, or breathing machines. · Try oral breathing devices or other nasal devices. · Talk to your doctor if your nose feels dry or bleeds, or if it gets runny or stuffy when you use a breathing machine. · Tell your doctor if you're sleepy during the day and it affects your daily life.  Don't drive or operate machinery when you're drowsy. When should you call for help? Watch closely for changes in your health, and be sure to contact your doctor if:    · You still have sleep apnea even though you have made lifestyle changes.     · You are thinking of trying a device such as CPAP.     · You are having problems using a CPAP or similar machine.     · You are still sleepy during the day, and it affects your daily life. Where can you learn more? Go to http://www.gray.com/  Enter J936 in the search box to learn more about \"Sleep Apnea: Care Instructions. \"  Current as of: July 6, 2021               Content Version: 13.0  © 0736-2043 [employer]. Care instructions adapted under license by [employer] (which disclaims liability or warranty for this information). If you have questions about a medical condition or this instruction, always ask your healthcare professional. [place] [address] any warranty or liability for your use of this information.

## 2022-03-02 NOTE — PROGRESS NOTES
[address] [place]. [place], [address]  Tel.  [phone]  Fax. [address] [facility] [address]  [place], [address] [facility]  Tel.  [phone]  Fax. [phone] [address] [facility] [place] [address]  Tel.  [phone]  Fax. [phone]         Chief Complaint       Chief Complaint   Patient presents with    Sleep Problem     1s adh         HPI        Aubrey Pedroza is a 62 y.o. female seen for follow-up. Patient notes having had initial evaluation a number of years ago at  \" [facility]\" at which time she was diagnosed with sleep apnea. Details in this regard not available. She was started on CPAP. She did not have formal compliance follow-up during this time. At 13 cm CPAP AHI variable; approximately 12.5/h. Reevaluated with PS respiratory events occurred of which 143 were hypopnea and 10 apnea (1 central, 9 obstructive). The overall AHI was 36.8/h. Minimal SaO2 81%. Severe snoring noted. BIPAP titration study: 19 respiratory events occurred of which 12 hypopnea and 7 obstructive apnea. The overall AHI was 4/h.     BiPAP initiated at 8/4 cm and increased to 13/8 cm. At 13/8 cm: 156.4 minutes recorded of which 105.9 minutes spent asleep and 37.9 minutes in rem. AHI 2.3/h. Minimal SaO2 89%.     BIPAP I 13/ E 8 cm initiated. Compliance data downloaded and reviewed in detail with the patient today. During the past 44 days, BIPAP used during 44 days with the average daily use of 8.65 hours. CMS compliance criteria 100%. AHI 6.1 per hour. Has been using a full facemask but notes that the mouth may drop below the mask; she has tried a chinstrap which she finds uncomfortable.     Allergies   Allergen Reactions    Tylenol-Codeine #3 [Acetaminophen-Codeine] Other (comments)     Vomitting, and upset stomach state that she is not allergic    Iodinated Contrast Media Hives       Current Outpatient Medications   Medication Sig Dispense Refill  albuterol (PROVENTIL HFA, VENTOLIN HFA, PROAIR HFA) 90 mcg/actuation inhaler TAKE 1 PUFF BY INHALATION EVERY SIX (6) HOURS AS NEEDED FOR WHEEZING. TAKE 1 PUFF BY INHALATION EVERY SIX (6) HOURS AS NEEDED FOR WHEEZING FOR UP TO 30 DAYS 8.5 Each 1    amLODIPine (NORVASC) 5 mg tablet TAKE 1 TABLET BY MOUTH EVERY DAY 90 Tab 0    estradiol (ESTRACE) 1 mg tablet Take 1 Tab by mouth daily. She  has a past medical history of Asthma, GERD (gastroesophageal reflux disease), Headache, and Nutcracker esophagus (2011). She  has a past surgical history that includes pr abdomen surgery proc unlisted; hx heent; endoscopy, colon, diagnostic; hx gyn; and hx lumbar diskectomy (9/5/2013). She family history includes Breast Cancer in her maternal aunt, maternal grandmother, and paternal aunt; Breast Cancer (age of onset: 54) in her paternal grandmother; Cancer in her mother; Heart Disease in her father. She  reports that she has never smoked. She has never used smokeless tobacco. She reports current alcohol use. She reports that she does not use drugs. Review of Systems:  Unchanged per patient      Objective:     Visit Vitals  /80   Pulse 79   Ht 5' 3\" (1.6 m)   Wt 203 lb (92.1 kg)   SpO2 98%   BMI 35.96 kg/m²     Body mass index is 35.96 kg/m². General:   Conversant, cooperative   Eyes: no nystagmus   Oropharynx:   Mallampati score I,  tongue scalloped                CVS:  Normal rate, regular rhythm        Neuro:  Speech fluent, face symmetrical             Assessment:       ICD-10-CM ICD-9-CM    1. COREY (obstructive sleep apnea)  G47.33 327.23    2. Severe obesity (BMI 35.0-39. 9) with comorbidity ([facility]  E66.01 278.01        she is compliant with PAP therapy and PAP continues to benefit patient and remains necessary for control of her sleep apnea. Variable AHI. Most likely patient's mouth opening below current CPAP mask. CPAP clinic.   Patient fitted with a ResMed F 30 I.  patient will contact the office if she has difficulty using the new mask. Follow-up in 1 month. Plan:   No orders of the defined types were placed in this encounter. *A copy of compliance data was provided to the patient and reviewed in detail. *BiPAP will be continued at the above pressure settings. The patient is to contact the office if there are problems with either mask or pressure settings. Follow-up will be scheduled at which time compliance data will be reviewed. * Patient has a history and examination consistent with the diagnosis of sleep apnea. * She was provided information on sleep apnea including corresponding risk factors and the importance of proper treatment. * Treatment options if indicated were reviewed today. Potential benefit of weight reduction    Lucy Loomis MD, FAASM  Electronically signed 03/02/22        This note was created using voice recognition software. Despite editing, there may be syntax errors. This note will not be viewable in [address]

## 2022-03-19 PROBLEM — Z99.89 OBSTRUCTIVE SLEEP APNEA ON CPAP: Status: ACTIVE | Noted: 2019-08-28

## 2022-03-19 PROBLEM — G43.009 MIGRAINE WITHOUT AURA AND WITHOUT STATUS MIGRAINOSUS, NOT INTRACTABLE: Status: ACTIVE | Noted: 2019-08-28

## 2022-03-19 PROBLEM — K22.4 ESOPHAGEAL SPASM: Status: ACTIVE | Noted: 2020-05-13

## 2022-03-19 PROBLEM — G47.33 OBSTRUCTIVE SLEEP APNEA ON CPAP: Status: ACTIVE | Noted: 2019-08-28

## 2022-04-07 ENCOUNTER — OFFICE VISIT (OUTPATIENT)
Dept: SLEEP MEDICINE | Age: 62
End: 2022-04-07

## 2022-04-07 VITALS — BODY MASS INDEX: 35.96 KG/M2 | HEIGHT: 63 IN

## 2022-05-06 ENCOUNTER — TRANSCRIBE ORDER (OUTPATIENT)
Dept: SCHEDULING | Age: 62
End: 2022-05-06

## 2022-05-06 DIAGNOSIS — Z00.00 ROUTINE GENERAL MEDICAL EXAMINATION AT A HEALTH CARE FACILITY: Primary | ICD-10-CM

## 2022-05-17 ENCOUNTER — HOSPITAL ENCOUNTER (OUTPATIENT)
Dept: CT IMAGING | Age: 62
Discharge: HOME | End: 2022-05-17
Attending: SPECIALIST

## 2022-05-17 DIAGNOSIS — Z00.00 ROUTINE GENERAL MEDICAL EXAMINATION AT A HEALTH CARE FACILITY: ICD-10-CM

## 2022-05-17 PROCEDURE — 75571 CT HRT W/O DYE W/CA TEST: CPT

## 2022-10-20 NOTE — PROGRESS NOTES
[address] [place]. [place], [address]   Tel.  [phone]   Fax. 100 [facility] 60   [place], [address] [facility]   Tel.  [phone]   Fax. [phone] [address] [facility] [place]   Tel.  [phone]   Fax. [phone] [facility] (: 1960) is a 62 y.o. female, established patient, seen for positive airway pressure follow-up and evaluation. She was last seen by Dr. Arlin Pabon on 3/2022, prior notes reviewed in detail. In lab split sleep test 2021 showed AHI of 36.8/hr with a lowest SaO2 of 81%. A later titration study showed events responding to Bilevel therapy. She is seen today for follow up. ASSESSMENT/PLAN:    ICD-10-CM ICD-9-CM    1. COREY (obstructive sleep apnea)  G47.33 327.23 AMB SUPPLY ORDER      2. BMI 34.0-34.9,adult  Z68.34 V85.34         AHI = 37 (). On Bi - Level :  IPAP 13 cmH2O; EPAP 8 cmH2O. Set up ? Shoshana Garcia She is adherent with PAP therapy and PAP continues to benefit patient and remains necessary for control of her sleep apnea. Sleep Apnea - continue on current pressures. * Counseling was provided regarding the importance of regular PAP use with emphasis on ensuring sufficient total sleep time, proper sleep hygiene, and safe driving. * Re-enforced proper and regular cleaning for the device. * She was asked to contact our office for any problems regarding PAP therapy. 2. Recommended a dedicated weight loss program through appropriate diet and exercise regimen as significant weight reduction has been shown to reduce severity of obstructive sleep apnea. SUBJECTIVE/OBJECTIVE:    She  is seen today for follow up on PAP device and reports no problems using the device.    The following concerns identified:    Drowsiness no Problems exhaling no   Snoring no Forget to put on no   Mask Comfortable yes Can't fall asleep no   Dry Mouth no Mask falls off no   Air Leaking no Frequent awakenings no She admits that her sleep has significantly improved on PAP therapy using full face mask and heated tubing. Review of device download indicated:  BiLevel pressure: IPAP 13 cmH2O; EPAP 8 cmH2O  % Used Days >= 4 hours: 93.  Avg hours used:  8 hours. Therapy Apnea Index averaged over PAP use: 4.5 /hr which reflects significantly improved sleep breathing condition. [place] Sleepiness Score: 1 and Modified F.O.S.Q. Score Total / 2: 20 which reflects improved sleep quality over therapy time. Sleep Review of Systems: notable for Negative difficulty falling asleep; Negative awakenings at night; Negative early morning headaches; Negative memory problems; Negative concentration issues; Negative chest pain; Negative shortness of breath; Negative significant joint pain at night; Negative significant muscle pain at night; Negative rashes or itching; Negative heartburn; Negative significant mood issues    Visit Vitals  /77 (BP 1 Location: Right upper arm, BP Patient Position: Sitting)   Pulse 67   Ht 5' 3\" (1.6 m)   Wt 197 lb 4.8 oz (89.5 kg)   SpO2 100%   BMI 34.95 kg/m²          General:   Alert, oriented, not in acute distress   Eyes:  Anicteric Sclerae; no obvious strabismus   Nose:  No obvious nasal septum deviation    Neck:   Midline trachea   Chest/Lungs:  Symmetrical lung expansion, clear lung fields on auscultation    CVS:  Normal rate, regular rhythm,  no JVD   Extremities:  No obvious rashes, no edema    Neuro:  No focal deficits; No obvious tremor    Psych:  Normal affect,  normal countenance     Patient's phone number [phone] (home) [phone] (work) was reviewed and confirmed for accuracy. She gives permission for messages regarding results and appointments to be left at that number.     On this date 10/21/2022 I have spent 20 minutes reviewing previous notes, test results and face to face with the patient discussing the diagnosis and importance of compliance with the treatment plan as well as documenting on the day of the visit. An electronic signature was used to authenticate this note.     -- Darvin Rendon NP, [facility]  10/21/22

## 2022-10-21 ENCOUNTER — OFFICE VISIT (OUTPATIENT)
Dept: SLEEP MEDICINE | Age: 62
End: 2022-10-21
Payer: COMMERCIAL

## 2022-10-21 ENCOUNTER — DOCUMENTATION ONLY (OUTPATIENT)
Dept: SLEEP MEDICINE | Age: 62
End: 2022-10-21

## 2022-10-21 VITALS
HEIGHT: 63 IN | WEIGHT: 197.3 LBS | SYSTOLIC BLOOD PRESSURE: 138 MMHG | DIASTOLIC BLOOD PRESSURE: 77 MMHG | BODY MASS INDEX: 34.96 KG/M2 | OXYGEN SATURATION: 100 % | HEART RATE: 67 BPM

## 2022-10-21 DIAGNOSIS — G47.33 OSA (OBSTRUCTIVE SLEEP APNEA): Primary | ICD-10-CM

## 2022-10-21 PROCEDURE — 99213 OFFICE O/P EST LOW 20 MIN: CPT | Performed by: NURSE PRACTITIONER

## 2022-10-21 NOTE — Clinical Note
NOTIFICATION RETURN TO WORK / SCHOOL    10/21/2022 10:46 AM    Ms. Aubrey Pedroza  [address] [place] [address]      To Whom It May Concern: Aubrey Pedroza is currently under the care of [address] [place] [address]. She will return to work/school on: ***    If there are questions or concerns please have the patient contact our office.         Sincerely,      Yary Sheriff NP

## 2022-10-21 NOTE — PATIENT INSTRUCTIONS
[address] [facility] [place]. [place], [address]  Tel.  [phone]  Fax. [phone] [facility]  [place], [address] [facility]  Tel.  [phone]  Fax. [phone] [address]  Tel.  [phone]  Fax. [phone]     Learning About CPAP for Sleep Apnea  What is CPAP? CPAP is a small machine that you use at home every night while you sleep. It increases air pressure in your throat to keep your airway open. When you have sleep apnea, this can help you sleep better so you feel much better. CPAP stands for \"continuous positive airway pressure. \"  The CPAP machine will have one of the following:  A mask that covers your nose and mouth  Prongs that fit into your nose  A mask that covers your nose only, the most common type. This type is called NCPAP. The N stands for \"nasal.\"  Why is it done? CPAP is usually the best treatment for obstructive sleep apnea. It is the first treatment choice and the most widely used. Your doctor may suggest CPAP if you have: Moderate to severe sleep apnea. Sleep apnea and coronary artery disease (CAD) or heart failure. How does it help? CPAP can help you have more normal sleep, so you feel less sleepy and more alert during the daytime. CPAP may help keep heart failure or other heart problems from getting worse. NCPAP may help lower your blood pressure. If you use CPAP, your bed partner may also sleep better because you are not snoring or restless. What are the side effects? Some people who use CPAP have:  A dry or stuffy nose and a sore throat. Irritated skin on the face. Sore eyes. Bloating. If you have any of these problems, work with your doctor to fix them. Here are some things you can try:  Be sure the mask or nasal prongs fit well. See if your doctor can adjust the pressure of your CPAP. If your nose is dry, try a humidifier.   If your nose is runny or stuffy, try decongestant medicine or a steroid nasal spray. If these things do not help, you might try a different type of machine. Some machines have air pressure that adjusts on its own. Others have air pressures that are different when you breathe in than when you breathe out. This may reduce discomfort caused by too much pressure in your nose. Where can you learn more? Go to IndiaHomes.be  Enter Yo Belmolly in the search box to learn more about \"Learning About CPAP for Sleep Apnea. \"   © 0880-7538 Healthwise, Incorporated. Care instructions adapted under license by [facility] (which disclaims liability or warranty for this information). This care instruction is for use with your licensed healthcare professional. If you have questions about a medical condition or this instruction, always ask your healthcare professional. [address] any warranty or liability for your use of this information. Content Version: 3.8.01936; Last Revised: January 11, 2010  PROPER SLEEP HYGIENE    What to avoid  Do not have drinks with caffeine, such as coffee or black tea, for 8 hours before bed. Do not smoke or use other types of tobacco near bedtime. Nicotine is a stimulant and can keep you awake. Avoid drinking alcohol late in the evening, because it can cause you to wake in the middle of the night. Do not eat a big meal close to bedtime. If you are hungry, eat a light snack. Do not drink a lot of water close to bedtime, because the need to urinate may wake you up during the night. Do not read or watch TV in bed. Use the bed only for sleeping and sexual activity. What to try  Go to bed at the same time every night, and wake up at the same time every morning. Do not take naps during the day. Keep your bedroom quiet, dark, and cool. Get regular exercise, but not within 3 to 4 hours of your bedtime. .  Sleep on a comfortable pillow and mattress.   If watching the clock makes you anxious, turn it facing away from you so you cannot see the time. If you worry when you lie down, start a worry book. Well before bedtime, write down your worries, and then set the book and your concerns aside. Try meditation or other relaxation techniques before you go to bed. If you cannot fall asleep, get up and go to another room until you feel sleepy. Do something relaxing. Repeat your bedtime routine before you go to bed again. Make your house quiet and calm about an hour before bedtime. Turn down the lights, turn off the TV, log off the computer, and turn down the volume on music. This can help you relax after a busy day. Drowsy Driving: The [place] [address] cites drowsiness as a causing factor in more than 871,794 police reported crashes annually, resulting in 76,000 injuries and 1,500 deaths. Other surveys suggest 55% of people polled have driven while drowsy in the past year, 23% had fallen asleep but not crashed, 3% crashed, and 2% had and accident due to drowsy driving. Who is at risk? Young Drivers: One study of drowsy driving accidents states that 55% of the drivers were under 25 years. Of those, 75% were male. Shift Workers and Travelers: People who work overnight or travel across time zones frequently are at higher risk of experiencing Circadian Rhythm Disorders. They are trying to work and function when their body is programed to sleep. Sleep Deprived: Lack of sleep has a serious impact on your ability to pay attention or focus on a task. Consistently getting less than the average of 8 hours your body needs creates partial or cumulative sleep deprivation. Untreated Sleep Disorders: Sleep Apnea, Narcolepsy, R.L.S., and other sleep disorders (untreated) prevent a person from getting enough restful sleep. This leads to excessive daytime sleepiness and increases the risk for drowsy driving accidents by up to 7 times.   Medications / Alcohol: Even over the counter medications can cause drowsiness. Medications that impair a drivers attention should have a warning label. Alcohol naturally makes you sleepy and on its own can cause accidents. Combined with excessive drowsiness its effects are amplified. Signs of Drowsy Driving:   * You don't remember driving the last few miles   * You may drift out of your connie   * You are unable to focus and your thoughts wander   * You may yawn more often than normal   * You have difficulty keeping your eyes open / nodding off   * Missing traffic signs, speeding, or tailgating  Prevention-   Good sleep hygiene, lifestyle and behavioral choices have the most impact on drowsy driving. There is no substitute for sleep and the average person requires 8 hours nightly. If you find yourself driving drowsy, stop and sleep. Consider the sleep hygiene tips provided during your visit as well. Medication Refill Policy: Refills for all medications require 1 week advance notice. Please have your pharmacy fax a refill request. We are unable to fax, or call in \"controled substance\" medications and you will need to pick these prescriptions up from our office. [employer] Activation    Thank you for requesting access to [employer]. Please follow the instructions below to securely access and download your online medical record. [employer] allows you to send messages to your doctor, view your test results, renew your prescriptions, schedule appointments, and more. How Do I Sign Up? In your internet browser, go to [email or web address]. Click on the First Time User? Click Here link in the Sign In box. You will see the New Member Sign Up page. Enter your [employer] Access Code exactly as it appears below. You will not need to use this code after youve completed the sign-up process. If you do not sign up before the expiration date, you must request a new code. [employer] Access Code:  Activation code not generated  Current [employer] Status: Active (This is the date your panOpen access code will )    Enter the last four digits of your Social Security Number (xxxx) and Date of Birth (mm/dd/yyyy) as indicated and click Submit. You will be taken to the next sign-up page. Create a panOpen ID. This will be your panOpen login ID and cannot be changed, so think of one that is secure and easy to remember. Create a panOpen password. You can change your password at any time. Enter your Password Reset Question and Answer. This can be used at a later time if you forget your password. Enter your e-mail address. You will receive e-mail notification when new information is available in [address] Click Sign Up. You can now view and download portions of your medical record. Click the [employer] link to download a portable copy of your medical information. Additional Information    If you have questions, please call [phone]. Remember, panOpen is NOT to be used for urgent needs. For medical emergencies, dial 911.

## 2023-01-30 ENCOUNTER — OFFICE VISIT (OUTPATIENT)
Dept: PRIMARY CARE CLINIC | Age: 63
End: 2023-01-30
Payer: COMMERCIAL

## 2023-01-30 VITALS
BODY MASS INDEX: 34.2 KG/M2 | TEMPERATURE: 97.5 F | HEIGHT: 63 IN | OXYGEN SATURATION: 99 % | HEART RATE: 69 BPM | WEIGHT: 193 LBS | SYSTOLIC BLOOD PRESSURE: 129 MMHG | RESPIRATION RATE: 18 BRPM | DIASTOLIC BLOOD PRESSURE: 78 MMHG

## 2023-01-30 DIAGNOSIS — G47.33 OSA ON CPAP: ICD-10-CM

## 2023-01-30 DIAGNOSIS — E78.2 MIXED HYPERLIPIDEMIA: ICD-10-CM

## 2023-01-30 DIAGNOSIS — Z99.89 OSA ON CPAP: ICD-10-CM

## 2023-01-30 DIAGNOSIS — E55.9 VITAMIN D DEFICIENCY: ICD-10-CM

## 2023-01-30 DIAGNOSIS — E66.9 OBESITY (BMI 30.0-34.9): ICD-10-CM

## 2023-01-30 DIAGNOSIS — Z00.00 PHYSICAL EXAM: Primary | ICD-10-CM

## 2023-01-30 PROCEDURE — 99396 PREV VISIT EST AGE 40-64: CPT | Performed by: INTERNAL MEDICINE

## 2023-01-30 NOTE — PROGRESS NOTES
1. \"Have you been to the ER, urgent care clinic since your last visit? Hospitalized since your last visit? \" Yes When: Urgent care then ER [facility]     2. \"Have you seen or consulted any other health care providers outside of the [address] [place] [address] since your last visit? \" No     3. For patients aged 39-70: Has the patient had a colonoscopy / FIT/ Cologuard? Yes - no Care Gap present 2022       If the patient is female:    4. For patients aged 41-77: Has the patient had a mammogram within the past 2 years? Yes - no Care Gap present      5. For patients aged 21-65: Has the patient had a pap smear?  Yes - no Care Gap present     Chief Complaint   Patient presents with    Physical

## 2023-01-30 NOTE — PROGRESS NOTES
Subjective:   65 y.o. female for Well Woman Check. Her gyne and breast care is done elsewhere by her Ob-Gyne physician. She goes to [facility] for weight loss. Patient Active Problem List    Diagnosis Date Noted    Esophageal spasm 05/13/2020    Migraine without aura and without status migrainosus, not intractable 08/28/2019    Obstructive sleep apnea on CPAP 08/28/2019    Herniated disc 03/14/2014     Current Outpatient Medications   Medication Sig Dispense Refill    SEMAGLUTIDE SC by SubCUTAneous route. albuterol (PROVENTIL HFA, VENTOLIN HFA, PROAIR HFA) 90 mcg/actuation inhaler TAKE 1 PUFF BY INHALATION EVERY SIX (6) HOURS AS NEEDED FOR WHEEZING.  TAKE 1 PUFF BY INHALATION EVERY SIX (6) HOURS AS NEEDED FOR WHEEZING FOR UP TO 30 DAYS 8.5 Each 1     Allergies   Allergen Reactions    Tylenol-Codeine #3 [Acetaminophen-Codeine] Other (comments)     Vomitting, and upset stomach state that she is not allergic    Iodinated Contrast Media Hives     Past Medical History:   Diagnosis Date    Asthma     GERD (gastroesophageal reflux disease)     Headache     Nutcracker esophagus 2011     Past Surgical History:   Procedure Laterality Date    ENDOSCOPY, COLON, DIAGNOSTIC      HX GYN      HX HEENT      HX LUMBAR DISKECTOMY  9/5/2013    NE UNLISTED PROCEDURE ABDOMEN PERITONEUM & OMENTUM       Family History   Problem Relation Age of Onset    Cancer Mother     Heart Disease Father     Breast Cancer Maternal Grandmother     Breast Cancer Paternal Grandmother 54    Breast Cancer Maternal Aunt     Breast Cancer Paternal Aunt      Social History     Tobacco Use    Smoking status: Never    Smokeless tobacco: Never   Substance Use Topics    Alcohol use: Yes     Comment: occasional        Lab Results   Component Value Date/Time    WBC 5.7 05/04/2021 10:08 AM    HGB 13.6 05/04/2021 10:08 AM    HCT 42.8 05/04/2021 10:08 AM    PLATELET 561 44/00/7408 10:08 AM    MCV 88.8 05/04/2021 10:08 AM     Lab Results Component Value Date/Time    Hemoglobin A1c 5.7 (H) 10/09/2017 12:44 PM    Hemoglobin A1c 6.0 (H) 01/27/2017 09:43 AM    Hemoglobin A1c 6.1 (H) 05/26/2015 09:46 AM    Glucose 106 (H) 05/04/2021 10:08 AM    LDL, calculated 133.6 (H) 05/04/2021 10:08 AM    Creatinine 0.72 05/04/2021 10:08 AM      Lab Results   Component Value Date/Time    Cholesterol, total 241 (H) 05/04/2021 10:08 AM    HDL Cholesterol 66 05/04/2021 10:08 AM    LDL, calculated 133.6 (H) 05/04/2021 10:08 AM    Triglyceride 207 (H) 05/04/2021 10:08 AM    CHOL/HDL Ratio 3.7 05/04/2021 10:08 AM     Lab Results   Component Value Date/Time    ALT (SGPT) 26 05/04/2021 10:08 AM    Alk. phosphatase 93 05/04/2021 10:08 AM    Bilirubin, total 0.3 05/04/2021 10:08 AM    Albumin 4.2 05/04/2021 10:08 AM    Protein, total 7.9 05/04/2021 10:08 AM    PLATELET 216 07/92/5155 10:08 AM       Lab Results   Component Value Date/Time    GFR est non-AA >60 05/04/2021 10:08 AM    GFR est AA >60 05/04/2021 10:08 AM    Creatinine 0.72 05/04/2021 10:08 AM    BUN 20 05/04/2021 10:08 AM    Sodium 141 05/04/2021 10:08 AM    Potassium 5.0 05/04/2021 10:08 AM    Chloride 107 05/04/2021 10:08 AM    CO2 28 05/04/2021 10:08 AM     Lab Results   Component Value Date/Time    TSH 1.28 05/04/2021 10:08 AM         ROS: Feeling generally well. No TIA's or unusual headaches, no dysphagia. No prolonged cough. No dyspnea or chest pain on exertion. No abdominal pain, change in bowel habits, black or bloody stools. No urinary tract symptoms. No new or unusual musculoskeletal symptoms. Specific concerns today: fatigue     Objective: The patient appears well, alert, oriented x 3, in no distress. Visit Vitals  /78 (BP 1 Location: Right upper arm, BP Patient Position: Sitting)   Pulse 69   Temp 97.5 °F (36.4 °C) (Temporal)   Resp 18   Ht 5' 3\" (1.6 m)   Wt 193 lb (87.5 kg)   SpO2 99%   BMI 34.19 kg/m²     ENT normal.  Neck supple. No adenopathy or thyromegaly. LISETH.  Lungs are clear, good air entry, no wheezes, rhonchi or rales. S1 and S2 normal, no murmurs, regular rate and rhythm. Abdomen soft without tenderness, guarding, mass or organomegaly. Extremities show no edema, normal peripheral pulses. Neurological is normal, no focal findings. Breast and Pelvic exams are deferred. Assessment/Plan:   Well Woman  lose weight, increase physical activity, follow low fat diet, continue present plan, routine labs ordered    ICD-10-CM ICD-9-CM    1. Physical exam  Z00.00 V70.9 TSH 3RD GENERATION      METABOLIC PANEL, COMPREHENSIVE      CBC W/O DIFF  Complete physical exam done       2. Vitamin D deficiency  E55.9 268.9 VITAMIN D, 25 HYDROXY      3. Mixed hyperlipidemia  E78.2 272.2 LIPID PANEL      4. COREY on CPAP  G47.33 327.23 Doing well on current adjustment     Z99.89 V46.8       5. Obesity (BMI 30.0-34. 9)  E66.9 278.00 On Semaglutide for weight loss. Toleration well.

## 2023-02-06 NOTE — PROGRESS NOTES
Chief Complaint   Patient presents with    Follow-up     er to [place] doctors, not experiencing pain anymore. did have cat scan and was told she had colitis.   had bad experience at the er and was runing a fever of [address]     would like labs for cholesterol Patient would like a refill called in for her test strips. She has been out for a couple of days the pharmacy is:[facility]

## 2023-05-22 RX ORDER — ALBUTEROL SULFATE 90 UG/1
1 AEROSOL, METERED RESPIRATORY (INHALATION) EVERY 6 HOURS PRN
COMMUNITY
Start: 2022-01-30

## 2023-05-22 RX ORDER — ALPRAZOLAM 0.5 MG/1
0.5 TABLET ORAL
COMMUNITY
Start: 2023-04-10

## 2023-10-16 ASSESSMENT — SLEEP AND FATIGUE QUESTIONNAIRES
HOW LIKELY ARE YOU TO NOD OFF OR FALL ASLEEP WHILE WATCHING TV: SLIGHT CHANCE OF DOZING
HOW LIKELY ARE YOU TO NOD OFF OR FALL ASLEEP WHILE SITTING QUIETLY AFTER LUNCH WITHOUT ALCOHOL: 0
HOW LIKELY ARE YOU TO NOD OFF OR FALL ASLEEP WHILE WATCHING TV: 1
HOW LIKELY ARE YOU TO NOD OFF OR FALL ASLEEP WHILE SITTING AND READING: WOULD NEVER DOZE
DO YOU HAVE DIFFICULTY BEING AS ACTIVE AS YOU WANT TO BE IN THE MORNING BECAUSE YOU ARE SLEEPY OR TIRED: NO
DO YOU HAVE DIFFICULTY OPERATING A MOTOR VEHICLE FOR LONG DISTANCES (GREATER THAN 100 MILES) BECAUSE YOU BECOME SLEEPY: NO
HAS YOUR RELATIONSHIP WITH FAMILY, FRIENDS OR WORK COLLEAGUES BEEN AFFECTED BECAUSE YOU ARE SLEEPY OR TIRED: NO
HOW LIKELY ARE YOU TO NOD OFF OR FALL ASLEEP WHEN YOU ARE A PASSENGER IN A CAR FOR AN HOUR WITHOUT A BREAK: SLIGHT CHANCE OF DOZING
HOW LIKELY ARE YOU TO NOD OFF OR FALL ASLEEP IN A CAR, WHILE STOPPED FOR A FEW MINUTES IN TRAFFIC: WOULD NEVER DOZE
HOW LIKELY ARE YOU TO NOD OFF OR FALL ASLEEP WHILE SITTING INACTIVE IN A PUBLIC PLACE: 0
HOW LIKELY ARE YOU TO NOD OFF OR FALL ASLEEP WHILE LYING DOWN TO REST IN THE AFTERNOON WHEN CIRCUMSTANCES PERMIT: 1
DO YOU HAVE DIFFICULTY BEING AS ACTIVE AS YOU WANT TO BE IN THE EVENING BECAUSE YOU ARE SLEEPY OR TIRED: NO
HOW LIKELY ARE YOU TO NOD OFF OR FALL ASLEEP WHILE SITTING INACTIVE IN A PUBLIC PLACE: WOULD NEVER DOZE
HOW LIKELY ARE YOU TO NOD OFF OR FALL ASLEEP WHILE SITTING AND TALKING TO SOMEONE: WOULD NEVER DOZE
HOW LIKELY ARE YOU TO NOD OFF OR FALL ASLEEP WHILE SITTING AND TALKING TO SOMEONE: 0
HAS YOUR MOOD BEEN AFFECTED BECAUSE YOU ARE SLEEPY OR TIRED: NO
ESS TOTAL SCORE: 3
HOW LIKELY ARE YOU TO NOD OFF OR FALL ASLEEP WHILE SITTING AND READING: 0
DO YOU HAVE DIFFICULTY WATCHING A MOVIE OR VIDEO BECAUSE YOU BECOME SLEEPY OR TIRED: NO
DO YOU HAVE DIFFICULTY OPERATING A MOTOR VEHICLE FOR SHORT DISTANCES (LESS THAN 100 MILES) BECAUSE YOU BECOME SLEEPY: NO
HOW LIKELY ARE YOU TO NOD OFF OR FALL ASLEEP WHEN YOU ARE A PASSENGER IN A CAR FOR AN HOUR WITHOUT A BREAK: 1
DO YOU HAVE DIFFICULTY CONCENTRATING ON THE THINGS YOU DO BECAUSE YOU ARE SLEEPY OR TIRED: NO
HOW LIKELY ARE YOU TO NOD OFF OR FALL ASLEEP WHILE SITTING QUIETLY AFTER LUNCH WITHOUT ALCOHOL: WOULD NEVER DOZE
DO YOU HAVE DIFFICULTY VISITING YOUR FAMILY OR FRIENDS IN THEIR HOME BECAUSE YOU BECOME SLEEPY OR TIRED: NO
HOW LIKELY ARE YOU TO NOD OFF OR FALL ASLEEP WHILE LYING DOWN TO REST IN THE AFTERNOON WHEN CIRCUMSTANCES PERMIT: SLIGHT CHANCE OF DOZING
FOSQ SCORE: 20
HOW LIKELY ARE YOU TO NOD OFF OR FALL ASLEEP IN A CAR, WHILE STOPPED FOR A FEW MINUTES IN TRAFFIC: 0
DO YOU GENERALLY HAVE DIFFICULTY REMEMBERING THINGS BECAUSE YOU ARE SLEEPY OR TIRED: NO

## 2023-10-18 ENCOUNTER — OFFICE VISIT (OUTPATIENT)
Age: 63
End: 2023-10-18
Payer: COMMERCIAL

## 2023-10-18 ENCOUNTER — CLINICAL DOCUMENTATION (OUTPATIENT)
Age: 63
End: 2023-10-18

## 2023-10-18 VITALS
BODY MASS INDEX: 34.68 KG/M2 | WEIGHT: 195.7 LBS | OXYGEN SATURATION: 100 % | DIASTOLIC BLOOD PRESSURE: 81 MMHG | HEART RATE: 69 BPM | SYSTOLIC BLOOD PRESSURE: 124 MMHG | HEIGHT: 63 IN

## 2023-10-18 DIAGNOSIS — G47.33 OSA (OBSTRUCTIVE SLEEP APNEA): Primary | ICD-10-CM

## 2023-10-18 PROCEDURE — 99213 OFFICE O/P EST LOW 20 MIN: CPT | Performed by: NURSE PRACTITIONER

## 2023-10-18 NOTE — PROGRESS NOTES
[address] [facility] [place] [place], [address]   Tel.  [phone]   Fax. [facility], [address]   Tel.  [phone]   Fax. [phone]  [facility], [address] [facility]   Tel.  [phone]   Fax. [phone] FAITH Blank (: 1960) is a 62 y.o. female, established patient, seen for positive airway pressure follow-up and evaluation. She was last seen by me on 10/2022, previously seen by Dr. Yue Fuentes on 3/2022, prior notes reviewed in detail. In lab split sleep test 2021  showed AHI of 36.8/hr with a lowest SaO2 of 81%. A later titration study showed events responding to Bilevel therapy. She is seen today for follow up. ASSESSMENT/PLAN:   Diagnosis Orders   1. PAT (obstructive sleep apnea)  DME Order for [facility]) as OP      2. BMI 34.0-34.9,adult          AHI = 37 (). On Bi - Level :  IPAP 13 cmH2O; EPAP 8 cmH2O. Set up ? Diamond Hamman She is adherent with PAP therapy and PAP continues to benefit patient and remains necessary for control of her sleep apnea. No follow-up provider specified. Sleep Apnea - continue on current pressures. Orders Placed This Encounter   Procedures    DME Order for (Specify) as OP     Diagnosis: (G47.33) PAT (obstructive sleep apnea)  (primary encounter diagnosis)     Replacement Supplies for Positive Airway Pressure Therapy Device:   Duration of need: 99 months.  Full Face Mask 1 every 3 months.  Full Face Mask Cushion 1 per month. ResMed F30i FFM WIDE       Headgear 1 every 6 months.  Positive Airway Pressure chinstrap 1 every 6 months.  Tubing with heating element 1 every 3 months.  Filter(s) Disposable 2 per month.  Filter(s) Non-Disposable 1 every 6 months. .   [address] for Humidifier (Replace) 1 every 6 months. IRAM Herrera NPI: [id number]    Electronically signed.  Date:- 10/18/23     * Counseling was

## 2023-10-18 NOTE — PATIENT INSTRUCTIONS
[address] [facility].[place], [address]  Tel.  [phone]  Fax. [address] [facility], [address] [place] [address]  Tel.  [phone]  Fax. [phone] [facility], [address] [facility]  Tel.  [phone]  Fax. [phone]     Learning About CPAP for Sleep Apnea  What is CPAP? CPAP is a small machine that you use at home every night while you sleep. It increases air pressure in your throat to keep your airway open. When you have sleep apnea, this can help you sleep better so you feel much better. CPAP stands for \"continuous positive airway pressure. \"  The CPAP machine will have one of the following:  A mask that covers your nose and mouth  Prongs that fit into your nose  A mask that covers your nose only, the most common type. This type is called NCPAP. The N stands for \"nasal.\"  Why is it done? CPAP is usually the best treatment for obstructive sleep apnea. It is the first treatment choice and the most widely used. Your doctor may suggest CPAP if you have: Moderate to severe sleep apnea. Sleep apnea and coronary artery disease (CAD) or heart failure. How does it help? CPAP can help you have more normal sleep, so you feel less sleepy and more alert during the daytime. CPAP may help keep heart failure or other heart problems from getting worse. NCPAP may help lower your blood pressure. If you use CPAP, your bed partner may also sleep better because you are not snoring or restless. What are the side effects? Some people who use CPAP have:  A dry or stuffy nose and a sore throat. Irritated skin on the face. Sore eyes. Bloating. If you have any of these problems, work with your doctor to fix them. Here are some things you can try:  Be sure the mask or nasal prongs fit well. See if your doctor can adjust the pressure of your CPAP. If your nose is dry, try a humidifier.   If your nose is runny or stuffy, try decongestant medicine or a steroid

## 2024-02-14 ENCOUNTER — OFFICE VISIT (OUTPATIENT)
Dept: PRIMARY CARE CLINIC | Facility: CLINIC | Age: 64
End: 2024-02-14
Payer: COMMERCIAL

## 2024-02-14 VITALS
BODY MASS INDEX: 35.61 KG/M2 | WEIGHT: 201 LBS | SYSTOLIC BLOOD PRESSURE: 114 MMHG | HEART RATE: 78 BPM | OXYGEN SATURATION: 95 % | RESPIRATION RATE: 18 BRPM | DIASTOLIC BLOOD PRESSURE: 82 MMHG | TEMPERATURE: 97.5 F | HEIGHT: 63 IN

## 2024-02-14 DIAGNOSIS — E55.9 VITAMIN D DEFICIENCY: ICD-10-CM

## 2024-02-14 DIAGNOSIS — E78.2 MIXED HYPERLIPIDEMIA: ICD-10-CM

## 2024-02-14 DIAGNOSIS — Z00.00 PHYSICAL EXAM: ICD-10-CM

## 2024-02-14 DIAGNOSIS — Z00.00 PHYSICAL EXAM: Primary | ICD-10-CM

## 2024-02-14 DIAGNOSIS — J30.89 SEASONAL ALLERGIC RHINITIS DUE TO OTHER ALLERGIC TRIGGER: ICD-10-CM

## 2024-02-14 DIAGNOSIS — Z13.820 OSTEOPOROSIS SCREENING: ICD-10-CM

## 2024-02-14 DIAGNOSIS — Z11.4 ENCOUNTER FOR SCREENING FOR HIV: ICD-10-CM

## 2024-02-14 DIAGNOSIS — G43.009 MIGRAINE WITHOUT AURA AND WITHOUT STATUS MIGRAINOSUS, NOT INTRACTABLE: ICD-10-CM

## 2024-02-14 DIAGNOSIS — E66.9 OBESITY (BMI 30-39.9): ICD-10-CM

## 2024-02-14 DIAGNOSIS — Z23 NEED FOR TETANUS BOOSTER: ICD-10-CM

## 2024-02-14 DIAGNOSIS — G47.33 OSA TREATED WITH BIPAP: ICD-10-CM

## 2024-02-14 LAB
25(OH)D3 SERPL-MCNC: 13 NG/ML (ref 30–100)
ALBUMIN SERPL-MCNC: 4.1 G/DL (ref 3.5–5)
ALBUMIN/GLOB SERPL: 1.1 (ref 1.1–2.2)
ALP SERPL-CCNC: 87 U/L (ref 45–117)
ALT SERPL-CCNC: 32 U/L (ref 12–78)
ANION GAP SERPL CALC-SCNC: 1 MMOL/L (ref 5–15)
AST SERPL-CCNC: 16 U/L (ref 15–37)
BILIRUB SERPL-MCNC: 0.4 MG/DL (ref 0.2–1)
BUN SERPL-MCNC: 14 MG/DL (ref 6–20)
BUN/CREAT SERPL: 18 (ref 12–20)
CALCIUM SERPL-MCNC: 9.7 MG/DL (ref 8.5–10.1)
CHLORIDE SERPL-SCNC: 106 MMOL/L (ref 97–108)
CHOLEST SERPL-MCNC: 254 MG/DL
CO2 SERPL-SCNC: 29 MMOL/L (ref 21–32)
CREAT SERPL-MCNC: 0.78 MG/DL (ref 0.55–1.02)
ERYTHROCYTE [DISTWIDTH] IN BLOOD BY AUTOMATED COUNT: 12.8 % (ref 11.5–14.5)
GLOBULIN SER CALC-MCNC: 3.7 G/DL (ref 2–4)
GLUCOSE SERPL-MCNC: 99 MG/DL (ref 65–100)
HCT VFR BLD AUTO: 43.7 % (ref 35–47)
HDLC SERPL-MCNC: 73 MG/DL
HDLC SERPL: 3.5 (ref 0–5)
HGB BLD-MCNC: 14.5 G/DL (ref 11.5–16)
HIV 1+2 AB+HIV1 P24 AG SERPL QL IA: NONREACTIVE
HIV 1/2 RESULT COMMENT: NORMAL
LDLC SERPL CALC-MCNC: 149.4 MG/DL (ref 0–100)
MCH RBC QN AUTO: 28.1 PG (ref 26–34)
MCHC RBC AUTO-ENTMCNC: 33.2 G/DL (ref 30–36.5)
MCV RBC AUTO: 84.7 FL (ref 80–99)
NRBC # BLD: 0 K/UL (ref 0–0.01)
NRBC BLD-RTO: 0 PER 100 WBC
PLATELET # BLD AUTO: 212 K/UL (ref 150–400)
PMV BLD AUTO: 11.9 FL (ref 8.9–12.9)
POTASSIUM SERPL-SCNC: 4.4 MMOL/L (ref 3.5–5.1)
PROT SERPL-MCNC: 7.8 G/DL (ref 6.4–8.2)
RBC # BLD AUTO: 5.16 M/UL (ref 3.8–5.2)
SODIUM SERPL-SCNC: 136 MMOL/L (ref 136–145)
TRIGL SERPL-MCNC: 158 MG/DL
TSH SERPL DL<=0.05 MIU/L-ACNC: 1.42 UIU/ML (ref 0.36–3.74)
VLDLC SERPL CALC-MCNC: 31.6 MG/DL
WBC # BLD AUTO: 7 K/UL (ref 3.6–11)

## 2024-02-14 PROCEDURE — 99396 PREV VISIT EST AGE 40-64: CPT | Performed by: INTERNAL MEDICINE

## 2024-02-14 PROCEDURE — 99213 OFFICE O/P EST LOW 20 MIN: CPT | Performed by: INTERNAL MEDICINE

## 2024-02-14 RX ORDER — AZELASTINE 1 MG/ML
1 SPRAY, METERED NASAL 2 TIMES DAILY
Qty: 60 ML | Refills: 1 | Status: SHIPPED | OUTPATIENT
Start: 2024-02-14

## 2024-02-14 RX ORDER — TIRZEPATIDE 2.5 MG/.5ML
2.5 INJECTION, SOLUTION SUBCUTANEOUS
Qty: 2 ML | Refills: 0 | Status: SHIPPED | OUTPATIENT
Start: 2024-02-14 | End: 2024-03-15

## 2024-02-14 NOTE — PROGRESS NOTES
\"Have you been to the ER, urgent care clinic since your last visit?  Hospitalized since your last visit?\"    ER - cut the tip of her finger off.     “Have you seen or consulted any other health care providers outside of [facility] since your last visit?”    No    “Have you had a colorectal cancer screening such as a colonoscopy/FIT/Cologuard?    2023 Dr. Campos Kinney  Records have been requested via fax.        Chief Complaint   Patient presents with    Annual Exam       Pt is ok with scribe.

## 2024-02-14 NOTE — PROGRESS NOTES
Tricia Wells (:  1960) is a 63 y.o. female, Established patient, here for evaluation of the following chief complaint(s):  Annual Exam           ASSESSMENT/PLAN:  1. Physical exam  -     CBC; Future  -     Comprehensive Metabolic Panel; Future  -     TSH; Future  Complete physical done today. Routine lab work ordered. Waiting for results.    2. PAT treated with BiPAP  I recommend that she continue using her BiPAP machine. She is followed by Sleep Medicine.    3. Migraine without aura and without status migrainosus, not intractable  Controlled without treatment. Will continue to monitor.    4. Obesity (BMI 30-39.9)  -     Tirzepatide-Weight Management (ZEPBOUND) 2.5 MG/0.5ML SOAJ; Inject 2.5 mg into the skin every 7 days, Disp-2 mL, R-0Normal sent to pharmacy.  She has tried semaglutide (Wegovy) in the past, has tried phentermine in the past but discontinued it due to side effects, and has visited the [facility]. I prescribed Zepbound 2.5mg/0.5mL to start injecting 2.5mg weekly. Potential side effects were discussed.     5. Need for tetanus booster  -     Tetanus-Diphth-Acell Pertussis (BOOSTRIX) 5-2.5-18.5 LF-MCG/0.5 injection; Inject 0.5 mLs into the muscle once for 1 dose, Disp-0.5 mL, R-0Normal sent to pharmacy.  I prescribed the Tdap vaccine for health maintenance.     6. Osteoporosis screening  -     DEXA BONE DENSITY AXIAL SKELETON; Future  Ordered DEXA scan. Waiting for results.    7. Mixed hyperlipidemia  -     Lipid Panel; Future  I ordered a lipid panel.    8. Vitamin D deficiency  -     Vitamin D 25 Hydroxy; Future  Ordered lab work to check Vitamin D levels. Waiting for results. Recommend that patient take a Vitamin D supplement of 1,000 units daily.    9. Encounter for screening for HIV  -     HIV 1/2 Ag/Ab, 4TH Generation,W Rflx Confirm; Future  I ordered blood work to check for HIV.    10. Seasonal allergic rhinitis due to other allergic trigger  -     azelastine (ASTELIN)

## 2024-02-16 ENCOUNTER — TELEPHONE (OUTPATIENT)
Dept: PRIMARY CARE CLINIC | Facility: CLINIC | Age: 64
End: 2024-02-16

## 2024-02-27 ENCOUNTER — HOSPITAL ENCOUNTER (OUTPATIENT)
Facility: HOSPITAL | Age: 64
Discharge: HOME | End: 2024-03-01
Attending: INTERNAL MEDICINE
Payer: COMMERCIAL

## 2024-02-27 DIAGNOSIS — Z13.820 OSTEOPOROSIS SCREENING: ICD-10-CM

## 2024-02-27 PROCEDURE — 77080 DXA BONE DENSITY AXIAL: CPT

## 2024-03-14 DIAGNOSIS — E66.9 OBESITY (BMI 30-39.9): ICD-10-CM

## 2024-03-14 DIAGNOSIS — E66.9 OBESITY (BMI 30.0-34.9): Primary | ICD-10-CM

## 2024-03-14 RX ORDER — TIRZEPATIDE 2.5 MG/.5ML
INJECTION, SOLUTION SUBCUTANEOUS
OUTPATIENT
Start: 2024-03-14

## 2024-03-14 RX ORDER — TIRZEPATIDE 5 MG/.5ML
5 INJECTION, SOLUTION SUBCUTANEOUS
Qty: 2 ML | Refills: 0 | Status: SHIPPED | OUTPATIENT
Start: 2024-03-14 | End: 2024-04-13

## 2024-03-14 RX ORDER — TIRZEPATIDE 2.5 MG/.5ML
2.5 INJECTION, SOLUTION SUBCUTANEOUS
Qty: 2 ML | Refills: 0 | OUTPATIENT
Start: 2024-03-14 | End: 2024-04-13

## 2024-04-07 DIAGNOSIS — E66.9 OBESITY (BMI 30-39.9): ICD-10-CM

## 2024-04-07 RX ORDER — TIRZEPATIDE 5 MG/.5ML
5 INJECTION, SOLUTION SUBCUTANEOUS
Qty: 3 ML | Refills: 0 | Status: SHIPPED | OUTPATIENT
Start: 2024-04-07 | End: 2024-07-06

## 2024-04-12 SDOH — ECONOMIC STABILITY: FOOD INSECURITY: WITHIN THE PAST 12 MONTHS, THE FOOD YOU BOUGHT JUST DIDN'T LAST AND YOU DIDN'T HAVE MONEY TO GET MORE.: NEVER TRUE

## 2024-04-12 SDOH — ECONOMIC STABILITY: HOUSING INSECURITY
IN THE LAST 12 MONTHS, WAS THERE A TIME WHEN YOU DID NOT HAVE A STEADY PLACE TO SLEEP OR SLEPT IN A SHELTER (INCLUDING NOW)?: NO

## 2024-04-12 SDOH — ECONOMIC STABILITY: FOOD INSECURITY: WITHIN THE PAST 12 MONTHS, YOU WORRIED THAT YOUR FOOD WOULD RUN OUT BEFORE YOU GOT MONEY TO BUY MORE.: NEVER TRUE

## 2024-04-12 SDOH — ECONOMIC STABILITY: TRANSPORTATION INSECURITY
IN THE PAST 12 MONTHS, HAS LACK OF TRANSPORTATION KEPT YOU FROM MEETINGS, WORK, OR FROM GETTING THINGS NEEDED FOR DAILY LIVING?: NO

## 2024-04-12 SDOH — ECONOMIC STABILITY: INCOME INSECURITY: HOW HARD IS IT FOR YOU TO PAY FOR THE VERY BASICS LIKE FOOD, HOUSING, MEDICAL CARE, AND HEATING?: NOT VERY HARD

## 2024-04-15 ENCOUNTER — OFFICE VISIT (OUTPATIENT)
Dept: PRIMARY CARE CLINIC | Facility: CLINIC | Age: 64
End: 2024-04-15
Payer: COMMERCIAL

## 2024-04-15 VITALS
WEIGHT: 194 LBS | RESPIRATION RATE: 18 BRPM | HEIGHT: 63 IN | DIASTOLIC BLOOD PRESSURE: 74 MMHG | TEMPERATURE: 97.5 F | OXYGEN SATURATION: 99 % | BODY MASS INDEX: 34.38 KG/M2 | HEART RATE: 73 BPM | SYSTOLIC BLOOD PRESSURE: 136 MMHG

## 2024-04-15 DIAGNOSIS — G47.33 OSA TREATED WITH BIPAP: ICD-10-CM

## 2024-04-15 DIAGNOSIS — E66.9 OBESITY (BMI 30.0-34.9): Primary | ICD-10-CM

## 2024-04-15 PROCEDURE — 99213 OFFICE O/P EST LOW 20 MIN: CPT | Performed by: INTERNAL MEDICINE

## 2024-04-15 RX ORDER — TIRZEPATIDE 7.5 MG/.5ML
7.5 INJECTION, SOLUTION SUBCUTANEOUS
Qty: 2 ML | Refills: 1 | Status: SHIPPED | OUTPATIENT
Start: 2024-04-15 | End: 2024-05-15

## 2024-04-15 ASSESSMENT — PATIENT HEALTH QUESTIONNAIRE - PHQ9
SUM OF ALL RESPONSES TO PHQ QUESTIONS 1-9: 0
SUM OF ALL RESPONSES TO PHQ9 QUESTIONS 1 & 2: 0
SUM OF ALL RESPONSES TO PHQ QUESTIONS 1-9: 0
2. FEELING DOWN, DEPRESSED OR HOPELESS: NOT AT ALL
1. LITTLE INTEREST OR PLEASURE IN DOING THINGS: NOT AT ALL

## 2024-04-15 ASSESSMENT — ENCOUNTER SYMPTOMS
ABDOMINAL PAIN: 0
COLOR CHANGE: 0
BACK PAIN: 0
SORE THROAT: 0
EYE DISCHARGE: 0
CHEST TIGHTNESS: 0
DIARRHEA: 0
SHORTNESS OF BREATH: 0
CONSTIPATION: 0
RHINORRHEA: 0
COUGH: 0

## 2024-04-15 NOTE — PROGRESS NOTES
\"Have you been to the ER, urgent care clinic since your last visit?  Hospitalized since your last visit?\"    NO    “Have you seen or consulted any other health care providers outside of [facility] since your last visit?”    NO    “Have you had a colorectal cancer screening such as a colonoscopy/FIT/Cologuard?    2022         “Have you had a pap smear?”    [facility]   Record has been requested via fax.     Chief Complaint   Patient presents with    Follow-up       Pt is ok with both scribes.     Depression: Not at risk (2/14/2024)    PHQ-2     PHQ-2 Score: 0

## 2024-04-15 NOTE — PROGRESS NOTES
Tricia Wells (:  1960) is a 63 y.o. female, Established patient, here for evaluation of the following chief complaint(s):  Follow-up       ASSESSMENT/PLAN:  1. Obesity (BMI 30.0-34.9)  -     Tirzepatide-Weight Management (ZEPBOUND) 7.5 MG/0.5ML SOAJ; Inject 7.5 mg into the skin every 7 days, Disp-2 mL, R-1Normal sent to pharmacy.  Because she started to experience cravings again and a reduction in drug effectiveness, I increased Zepbound from 5mg/dose to 7mg/dose to continue injecting weekly. I recommend that she continue walking at last 5,000 steps daily and weightlifting. I explained that 5,000 steps are needed daily for healthy lifestyle and to maintain conditioning, and she will need to increase to 10,000 a day to work on weight loss.     2. PAT treated with BiPAP  I recommend that she continue using her BiPAP.        Subjective   SUBJECTIVE/OBJECTIVE:  HPI    Patient presents today for weight management.    She is injecting Zepbound 5mg/dose weekly and denies side effects such as nausea now. However, she is interested in increasing her dosage as she notes that o the 4th dose, she stated to experience cravings again and her appetite was not as well suppressed. She is unsure if her insurance covers Wegovy. She has lost 7 pounds since February. She states that she is walking 5,600 steps and weightlifting for 30 minutes.    She is using her BiPAP.    Patient Active Problem List   Diagnosis    Migraine without aura and without status migrainosus, not intractable    Esophageal spasm    PAT treated with BiPAP        Current Outpatient Medications on File Prior to Visit   Medication Sig Dispense Refill    Tirzepatide-Weight Management (ZEPBOUND) 5 MG/0.5ML SOAJ Inject 5 mg into the skin every 7 days 3 mL 0    azelastine (ASTELIN) 0.1 % nasal spray 1 spray by Nasal route 2 times daily Use in each nostril as directed 60 mL 1    albuterol sulfate HFA (PROVENTIL;VENTOLIN;PROAIR) 108 (90 Base) MCG/ACT

## 2024-04-16 DIAGNOSIS — E66.9 OBESITY (BMI 30-39.9): Primary | ICD-10-CM

## 2024-05-05 DIAGNOSIS — E66.9 OBESITY (BMI 30.0-34.9): Primary | ICD-10-CM

## 2024-05-28 DIAGNOSIS — E66.9 OBESITY (BMI 30.0-34.9): ICD-10-CM

## 2024-06-20 DIAGNOSIS — E66.9 OBESITY (BMI 30.0-34.9): ICD-10-CM

## 2024-06-20 RX ORDER — SEMAGLUTIDE 1.7 MG/.75ML
INJECTION, SOLUTION SUBCUTANEOUS
OUTPATIENT
Start: 2024-06-20

## 2024-07-16 DIAGNOSIS — E66.9 OBESITY (BMI 30.0-34.9): ICD-10-CM

## 2024-07-16 RX ORDER — SEMAGLUTIDE 1.7 MG/.75ML
INJECTION, SOLUTION SUBCUTANEOUS
Qty: 3 ML | Refills: 0 | Status: SHIPPED | OUTPATIENT
Start: 2024-07-16

## 2024-08-13 ENCOUNTER — OFFICE VISIT (OUTPATIENT)
Dept: PRIMARY CARE CLINIC | Facility: CLINIC | Age: 64
End: 2024-08-13
Payer: COMMERCIAL

## 2024-08-13 VITALS
HEIGHT: 63 IN | BODY MASS INDEX: 34.2 KG/M2 | OXYGEN SATURATION: 98 % | TEMPERATURE: 97 F | DIASTOLIC BLOOD PRESSURE: 76 MMHG | SYSTOLIC BLOOD PRESSURE: 124 MMHG | RESPIRATION RATE: 18 BRPM | WEIGHT: 193 LBS | HEART RATE: 68 BPM

## 2024-08-13 DIAGNOSIS — E78.2 MIXED HYPERLIPIDEMIA: ICD-10-CM

## 2024-08-13 DIAGNOSIS — E66.9 OBESITY (BMI 30.0-34.9): ICD-10-CM

## 2024-08-13 DIAGNOSIS — G43.009 MIGRAINE WITHOUT AURA AND WITHOUT STATUS MIGRAINOSUS, NOT INTRACTABLE: Primary | ICD-10-CM

## 2024-08-13 PROCEDURE — 99214 OFFICE O/P EST MOD 30 MIN: CPT | Performed by: INTERNAL MEDICINE

## 2024-08-13 RX ORDER — TOPIRAMATE 25 MG/1
25 TABLET ORAL 2 TIMES DAILY
Qty: 60 TABLET | Refills: 0 | Status: SHIPPED | OUTPATIENT
Start: 2024-08-13 | End: 2024-09-12

## 2024-08-13 ASSESSMENT — ENCOUNTER SYMPTOMS
COUGH: 0
SHORTNESS OF BREATH: 0
SORE THROAT: 0
EYE DISCHARGE: 0
CHEST TIGHTNESS: 0
COLOR CHANGE: 0
ABDOMINAL PAIN: 0
CONSTIPATION: 0
BACK PAIN: 0
RHINORRHEA: 0
DIARRHEA: 0

## 2024-08-13 ASSESSMENT — PATIENT HEALTH QUESTIONNAIRE - PHQ9
SUM OF ALL RESPONSES TO PHQ QUESTIONS 1-9: 0
SUM OF ALL RESPONSES TO PHQ9 QUESTIONS 1 & 2: 0
SUM OF ALL RESPONSES TO PHQ QUESTIONS 1-9: 0
2. FEELING DOWN, DEPRESSED OR HOPELESS: NOT AT ALL

## 2024-08-13 NOTE — PROGRESS NOTES
\"Have you been to the ER, urgent care clinic since your last visit?  Hospitalized since your last visit?\"    NO    “Have you seen or consulted any other health care providers outside of [facility] since your last visit?”    NO     “Have you had a pap smear?”      Date of last Cervical Cancer screen (HPV or PAP): 3/23/2021       Chief Complaint   Patient presents with    Weight Management       Pt is ok with scribe.

## 2024-08-13 NOTE — PROGRESS NOTES
Tricia Wells (:  1960) is a 63 y.o. female, Established patient, here for evaluation of the following chief complaint(s):  Weight Management        Assessment & Plan   ASSESSMENT/PLAN:  1. Migraine without aura and without status migrainosus, not intractable  -     topiramate (TOPAMAX) 25 MG tablet; Take 1 tablet by mouth 2 times daily, Disp-60 tablet, R-0Normal sent to pharmacy.  I prescribed the pt Topamax 25 mg BID for migraines.    2. Obesity (BMI 30.0-34.9)  -     Semaglutide-Weight Management (WEGOVY) 2.4 MG/0.75ML SOAJ SC injection; Inject 2.4 mg into the skin every 7 days, Disp-3 mL, R-2Normal sent to pharmacy.  I advised the pt we will go up on Wegovey. I increased the pt Wegovey to 2.4 mg. I advised the pt to begin her morning with protein then for lunch a protein shake and finish off with dinner. Pt should scooting her exercise regimen. If pt finds no success with this dosage we will consider different medication     3. Mixed hyperlipidemia  We will continue to monitor with diet and exercise. We will repeat labs on next follow up visit. If lipid panel comes back normal we will continue current regimen.               Subjective   SUBJECTIVE/OBJECTIVE:  HPI    Pt presents today for follow up.     Pt state she is having a hard time losing weight. She does walking and she has hand weights and does some weight lifting. She explains she does 7216-5333 steps a day.    Pt eats cottage cheese fruit 2 hard boiled eggs. For lunch she just grab something and for dinner she finds herself overeating. She states she still has sweet cravings.  Pt has tried Topamax before she states it gave her a weird taste in her mouth. She get migraines regularly    Patient Active Problem List   Diagnosis    Migraine without aura and without status migrainosus, not intractable    Esophageal spasm    PAT treated with BiPAP        Current Outpatient Medications on File Prior to Visit   Medication Sig Dispense Refill

## 2024-09-05 DIAGNOSIS — G43.009 MIGRAINE WITHOUT AURA AND WITHOUT STATUS MIGRAINOSUS, NOT INTRACTABLE: ICD-10-CM

## 2024-09-05 RX ORDER — TOPIRAMATE 25 MG/1
25 TABLET, FILM COATED ORAL 2 TIMES DAILY
Qty: 180 TABLET | Refills: 0 | Status: SHIPPED | OUTPATIENT
Start: 2024-09-05

## 2024-10-29 DIAGNOSIS — E66.811 OBESITY (BMI 30.0-34.9): ICD-10-CM

## 2024-10-29 RX ORDER — SEMAGLUTIDE 2.4 MG/.75ML
INJECTION, SOLUTION SUBCUTANEOUS
Qty: 3 ML | Refills: 2 | Status: SHIPPED | OUTPATIENT
Start: 2024-10-29

## 2024-11-03 ASSESSMENT — SLEEP AND FATIGUE QUESTIONNAIRES
FOSQ SCORE: 19.5
HOW LIKELY ARE YOU TO NOD OFF OR FALL ASLEEP WHILE WATCHING TV: SLIGHT CHANCE OF DOZING
HOW LIKELY ARE YOU TO NOD OFF OR FALL ASLEEP WHILE SITTING AND TALKING TO SOMEONE: WOULD NEVER DOZE
DO YOU HAVE DIFFICULTY BEING AS ACTIVE AS YOU WANT TO BE IN THE MORNING BECAUSE YOU ARE SLEEPY OR TIRED: NO
HOW LIKELY ARE YOU TO NOD OFF OR FALL ASLEEP IN A CAR, WHILE STOPPED FOR A FEW MINUTES IN TRAFFIC: WOULD NEVER DOZE
ESS TOTAL SCORE: 2
DO YOU HAVE DIFFICULTY OPERATING A MOTOR VEHICLE FOR SHORT DISTANCES (LESS THAN 100 MILES) BECAUSE YOU BECOME SLEEPY: NO
HOW LIKELY ARE YOU TO NOD OFF OR FALL ASLEEP IN A CAR, WHILE STOPPED FOR A FEW MINUTES IN TRAFFIC: WOULD NEVER DOZE
HOW LIKELY ARE YOU TO NOD OFF OR FALL ASLEEP WHILE LYING DOWN TO REST IN THE AFTERNOON WHEN CIRCUMSTANCES PERMIT: SLIGHT CHANCE OF DOZING
HOW LIKELY ARE YOU TO NOD OFF OR FALL ASLEEP WHEN YOU ARE A PASSENGER IN A CAR FOR AN HOUR WITHOUT A BREAK: WOULD NEVER DOZE
DO YOU HAVE DIFFICULTY BEING AS ACTIVE AS YOU WANT TO BE IN THE EVENING BECAUSE YOU ARE SLEEPY OR TIRED: YES, LITTLE
DO YOU GENERALLY HAVE DIFFICULTY REMEMBERING THINGS BECAUSE YOU ARE SLEEPY OR TIRED: NO
HOW LIKELY ARE YOU TO NOD OFF OR FALL ASLEEP WHILE SITTING AND READING: WOULD NEVER DOZE
HAS YOUR MOOD BEEN AFFECTED BECAUSE YOU ARE SLEEPY OR TIRED: NO
HOW LIKELY ARE YOU TO NOD OFF OR FALL ASLEEP WHILE SITTING QUIETLY AFTER LUNCH WITHOUT ALCOHOL: WOULD NEVER DOZE
DO YOU HAVE DIFFICULTY CONCENTRATING ON THE THINGS YOU DO BECAUSE YOU ARE SLEEPY OR TIRED: NO
HOW LIKELY ARE YOU TO NOD OFF OR FALL ASLEEP WHILE SITTING INACTIVE IN A PUBLIC PLACE: WOULD NEVER DOZE
HOW LIKELY ARE YOU TO NOD OFF OR FALL ASLEEP WHILE SITTING AND TALKING TO SOMEONE: WOULD NEVER DOZE
HOW LIKELY ARE YOU TO NOD OFF OR FALL ASLEEP WHILE LYING DOWN TO REST IN THE AFTERNOON WHEN CIRCUMSTANCES PERMIT: SLIGHT CHANCE OF DOZING
HOW LIKELY ARE YOU TO NOD OFF OR FALL ASLEEP WHILE SITTING AND READING: WOULD NEVER DOZE
HOW LIKELY ARE YOU TO NOD OFF OR FALL ASLEEP WHILE SITTING QUIETLY AFTER LUNCH WITHOUT ALCOHOL: WOULD NEVER DOZE
HOW LIKELY ARE YOU TO NOD OFF OR FALL ASLEEP WHEN YOU ARE A PASSENGER IN A CAR FOR AN HOUR WITHOUT A BREAK: WOULD NEVER DOZE
DO YOU HAVE DIFFICULTY OPERATING A MOTOR VEHICLE FOR LONG DISTANCES (GREATER THAN 100 MILES) BECAUSE YOU BECOME SLEEPY: NO
HAS YOUR RELATIONSHIP WITH FAMILY, FRIENDS OR WORK COLLEAGUES BEEN AFFECTED BECAUSE YOU ARE SLEEPY OR TIRED: NO
DO YOU HAVE DIFFICULTY WATCHING A MOVIE OR VIDEO BECAUSE YOU BECOME SLEEPY OR TIRED: NO
HOW LIKELY ARE YOU TO NOD OFF OR FALL ASLEEP WHILE WATCHING TV: SLIGHT CHANCE OF DOZING
HOW LIKELY ARE YOU TO NOD OFF OR FALL ASLEEP WHILE SITTING INACTIVE IN A PUBLIC PLACE: WOULD NEVER DOZE
DO YOU HAVE DIFFICULTY VISITING YOUR FAMILY OR FRIENDS IN THEIR HOME BECAUSE YOU BECOME SLEEPY OR TIRED: NO

## 2024-11-06 ENCOUNTER — TELEMEDICINE (OUTPATIENT)
Age: 64
End: 2024-11-06
Payer: COMMERCIAL

## 2024-11-06 DIAGNOSIS — G47.33 OSA (OBSTRUCTIVE SLEEP APNEA): Primary | ICD-10-CM

## 2024-11-06 PROCEDURE — 99213 OFFICE O/P EST LOW 20 MIN: CPT | Performed by: NURSE PRACTITIONER

## 2024-11-06 NOTE — PATIENT INSTRUCTIONS
[address]  [place] [address]  Tel.  [phone]  Fax. [phone] [address]  [place] [address]  Tel.  [phone]  Fax. [phone] [address] [place] [address]  Tel.  [phone]  Fax. [phone]     Learning About CPAP for Sleep Apnea  What is CPAP?              CPAP is a small machine that you use at home every night while you sleep. It increases air pressure in your throat to keep your airway open. When you have sleep apnea, this can help you sleep better so you feel much better. CPAP stands for \"continuous positive airway pressure.\"  The CPAP machine will have one of the following:  A mask that covers your nose and mouth  Prongs that fit into your nose  A mask that covers your nose only, the most common type. This type is called NCPAP. The N stands for \"nasal.\"  Why is it done?  CPAP is usually the best treatment for obstructive sleep apnea. It is the first treatment choice and the most widely used. Your doctor may suggest CPAP if you have:  Moderate to severe sleep apnea.  Sleep apnea and coronary artery disease (CAD) or heart failure.  How does it help?  CPAP can help you have more normal sleep, so you feel less sleepy and more alert during the daytime.  CPAP may help keep heart failure or other heart problems from getting worse.  NCPAP may help lower your blood pressure.  If you use CPAP, your bed partner may also sleep better because you are not snoring or restless.  What are the side effects?  Some people who use CPAP have:  A dry or stuffy nose and a sore throat.  Irritated skin on the face.  Sore eyes.  Bloating.  If you have any of these problems, work with your doctor to fix them. Here are some things you can try:  Be sure the mask or nasal prongs fit well.  See if your doctor can adjust the pressure of your CPAP.  If your nose is dry, try a humidifier.  If your nose is runny or stuffy, try decongestant medicine or a steroid

## 2024-11-08 ENCOUNTER — CLINICAL DOCUMENTATION (OUTPATIENT)
Age: 64
End: 2024-11-08

## 2025-01-17 DIAGNOSIS — E66.811 OBESITY (BMI 30.0-34.9): ICD-10-CM

## 2025-01-17 RX ORDER — SEMAGLUTIDE 2.4 MG/.75ML
INJECTION, SOLUTION SUBCUTANEOUS
Qty: 3 ML | Refills: 2 | Status: SHIPPED | OUTPATIENT
Start: 2025-01-17

## 2025-03-19 DIAGNOSIS — J30.89 SEASONAL ALLERGIC RHINITIS DUE TO OTHER ALLERGIC TRIGGER: ICD-10-CM

## 2025-03-20 RX ORDER — AZELASTINE HYDROCHLORIDE 137 UG/1
1 SPRAY, METERED NASAL 2 TIMES DAILY
Qty: 1 EACH | Refills: 1 | Status: SHIPPED | OUTPATIENT
Start: 2025-03-20

## 2025-04-17 DIAGNOSIS — J30.89 SEASONAL ALLERGIC RHINITIS DUE TO OTHER ALLERGIC TRIGGER: ICD-10-CM

## 2025-04-17 RX ORDER — AZELASTINE HYDROCHLORIDE 137 UG/1
SPRAY, METERED NASAL
Qty: 1 EACH | Refills: 1 | Status: SHIPPED | OUTPATIENT
Start: 2025-04-17

## 2025-04-19 DIAGNOSIS — E66.811 OBESITY (BMI 30.0-34.9): ICD-10-CM

## 2025-04-20 RX ORDER — SEMAGLUTIDE 2.4 MG/.75ML
INJECTION, SOLUTION SUBCUTANEOUS
Qty: 3 ML | Refills: 1 | Status: SHIPPED | OUTPATIENT
Start: 2025-04-20

## 2025-06-07 DIAGNOSIS — E66.811 OBESITY (BMI 30.0-34.9): ICD-10-CM

## 2025-06-07 RX ORDER — SEMAGLUTIDE 2.4 MG/.75ML
INJECTION, SOLUTION SUBCUTANEOUS
Qty: 3 ML | Refills: 0 | Status: SHIPPED | OUTPATIENT
Start: 2025-06-07

## 2025-06-11 ENCOUNTER — OFFICE VISIT (OUTPATIENT)
Dept: PRIMARY CARE CLINIC | Facility: CLINIC | Age: 65
End: 2025-06-11
Payer: COMMERCIAL

## 2025-06-11 VITALS
SYSTOLIC BLOOD PRESSURE: 125 MMHG | WEIGHT: 192.8 LBS | HEIGHT: 63 IN | DIASTOLIC BLOOD PRESSURE: 79 MMHG | BODY MASS INDEX: 34.16 KG/M2 | RESPIRATION RATE: 15 BRPM | HEART RATE: 72 BPM | OXYGEN SATURATION: 98 %

## 2025-06-11 DIAGNOSIS — E78.2 COMBINED HYPERLIPIDEMIA: ICD-10-CM

## 2025-06-11 DIAGNOSIS — E66.9 OBESITY (BMI 30-39.9): ICD-10-CM

## 2025-06-11 DIAGNOSIS — E55.9 VITAMIN D DEFICIENCY: ICD-10-CM

## 2025-06-11 DIAGNOSIS — F41.1 ANXIETY STATES: ICD-10-CM

## 2025-06-11 DIAGNOSIS — Z23 NEED FOR VACCINATION WITH 20-POLYVALENT PNEUMOCOCCAL CONJUGATE VACCINE: ICD-10-CM

## 2025-06-11 DIAGNOSIS — Z00.00 PHYSICAL EXAM: ICD-10-CM

## 2025-06-11 DIAGNOSIS — Z00.00 PHYSICAL EXAM: Primary | ICD-10-CM

## 2025-06-11 DIAGNOSIS — Z23 NEED FOR TETANUS BOOSTER: ICD-10-CM

## 2025-06-11 DIAGNOSIS — G47.33 OSA TREATED WITH BIPAP: ICD-10-CM

## 2025-06-11 LAB
25(OH)D3 SERPL-MCNC: 24.1 NG/ML (ref 30–100)
ALBUMIN SERPL-MCNC: 4 G/DL (ref 3.5–5)
ALBUMIN/GLOB SERPL: 1.1 (ref 1.1–2.2)
ALP SERPL-CCNC: 95 U/L (ref 45–117)
ALT SERPL-CCNC: 31 U/L (ref 12–78)
ANION GAP SERPL CALC-SCNC: 8 MMOL/L (ref 2–12)
AST SERPL-CCNC: 16 U/L (ref 15–37)
BILIRUB SERPL-MCNC: 0.4 MG/DL (ref 0.2–1)
BUN SERPL-MCNC: 18 MG/DL (ref 6–20)
BUN/CREAT SERPL: 23 (ref 12–20)
CALCIUM SERPL-MCNC: 10.1 MG/DL (ref 8.5–10.1)
CHLORIDE SERPL-SCNC: 101 MMOL/L (ref 97–108)
CHOLEST SERPL-MCNC: 237 MG/DL
CO2 SERPL-SCNC: 26 MMOL/L (ref 21–32)
CREAT SERPL-MCNC: 0.8 MG/DL (ref 0.55–1.02)
ERYTHROCYTE [DISTWIDTH] IN BLOOD BY AUTOMATED COUNT: 12.8 % (ref 11.5–14.5)
GLOBULIN SER CALC-MCNC: 3.8 G/DL (ref 2–4)
GLUCOSE SERPL-MCNC: 84 MG/DL (ref 65–100)
HCT VFR BLD AUTO: 46.6 % (ref 35–47)
HDLC SERPL-MCNC: 65 MG/DL
HDLC SERPL: 3.6 (ref 0–5)
HGB BLD-MCNC: 14.5 G/DL (ref 11.5–16)
LDLC SERPL CALC-MCNC: 140 MG/DL (ref 0–100)
MCH RBC QN AUTO: 27.9 PG (ref 26–34)
MCHC RBC AUTO-ENTMCNC: 31.1 G/DL (ref 30–36.5)
MCV RBC AUTO: 89.8 FL (ref 80–99)
NRBC # BLD: 0 K/UL (ref 0–0.01)
NRBC BLD-RTO: 0 PER 100 WBC
PLATELET # BLD AUTO: 255 K/UL (ref 150–400)
PMV BLD AUTO: 11.1 FL (ref 8.9–12.9)
POTASSIUM SERPL-SCNC: 4.4 MMOL/L (ref 3.5–5.1)
PROT SERPL-MCNC: 7.8 G/DL (ref 6.4–8.2)
RBC # BLD AUTO: 5.19 M/UL (ref 3.8–5.2)
SODIUM SERPL-SCNC: 135 MMOL/L (ref 136–145)
TRIGL SERPL-MCNC: 160 MG/DL
TSH SERPL DL<=0.05 MIU/L-ACNC: 1.33 UIU/ML (ref 0.36–3.74)
VLDLC SERPL CALC-MCNC: 32 MG/DL
WBC # BLD AUTO: 7.2 K/UL (ref 3.6–11)

## 2025-06-11 PROCEDURE — 99396 PREV VISIT EST AGE 40-64: CPT | Performed by: INTERNAL MEDICINE

## 2025-06-11 PROCEDURE — 90677 PCV20 VACCINE IM: CPT | Performed by: INTERNAL MEDICINE

## 2025-06-11 PROCEDURE — 99214 OFFICE O/P EST MOD 30 MIN: CPT | Performed by: INTERNAL MEDICINE

## 2025-06-11 PROCEDURE — 90471 IMMUNIZATION ADMIN: CPT | Performed by: INTERNAL MEDICINE

## 2025-06-11 RX ORDER — HYDROXYZINE HYDROCHLORIDE 25 MG/1
25 TABLET, FILM COATED ORAL EVERY 8 HOURS PRN
Qty: 30 TABLET | Refills: 0 | Status: SHIPPED | OUTPATIENT
Start: 2025-06-11

## 2025-06-11 SDOH — ECONOMIC STABILITY: FOOD INSECURITY: WITHIN THE PAST 12 MONTHS, YOU WORRIED THAT YOUR FOOD WOULD RUN OUT BEFORE YOU GOT MONEY TO BUY MORE.: PATIENT DECLINED

## 2025-06-11 SDOH — ECONOMIC STABILITY: FOOD INSECURITY: WITHIN THE PAST 12 MONTHS, THE FOOD YOU BOUGHT JUST DIDN'T LAST AND YOU DIDN'T HAVE MONEY TO GET MORE.: PATIENT DECLINED

## 2025-06-11 ASSESSMENT — ENCOUNTER SYMPTOMS
RHINORRHEA: 0
COLOR CHANGE: 0
SORE THROAT: 0
SHORTNESS OF BREATH: 0
DIARRHEA: 0
ABDOMINAL PAIN: 0
EYE DISCHARGE: 0
CONSTIPATION: 0
BACK PAIN: 0
CHEST TIGHTNESS: 0
COUGH: 0

## 2025-06-11 ASSESSMENT — PATIENT HEALTH QUESTIONNAIRE - PHQ9
SUM OF ALL RESPONSES TO PHQ QUESTIONS 1-9: 0
SUM OF ALL RESPONSES TO PHQ QUESTIONS 1-9: 0
2. FEELING DOWN, DEPRESSED OR HOPELESS: NOT AT ALL
1. LITTLE INTEREST OR PLEASURE IN DOING THINGS: NOT AT ALL
SUM OF ALL RESPONSES TO PHQ QUESTIONS 1-9: 0
SUM OF ALL RESPONSES TO PHQ QUESTIONS 1-9: 0

## 2025-06-11 NOTE — PROGRESS NOTES
Health Decision Maker has been checked with the patient   Primary Decision Maker: Campos Wells - Spouse - [phone]     Patient has stated that the scribe can come in room    Chief Complaint   Patient presents with    Annual Exam    Panic Attack     Hyperventilate, head pressure chest pain on and off for 5 months - work induced         \"Have you been to the ER, urgent care clinic since your last visit?  Hospitalized since your last visit?\"    NO    “Have you seen or consulted any other health care providers outside of [facility] since your last visit?”    NO      Vitals:    06/11/25 0908   BP: 125/79   BP Site: Left Upper Arm   Patient Position: Sitting   BP Cuff Size: Large Adult   Pulse: 72   Resp: 15   SpO2: 98%   Weight: 87.5 kg (192 lb 12.8 oz)   Height: 1.6 m (5' 3\")      Depression: Not at risk (6/11/2025)    PHQ-2     PHQ-2 Score: 0       “Have you had a pap smear?”    YES - Where: [facility] Nurse/CMA to request most recent records if not in the chart    Date of last Cervical Cancer screen (HPV or PAP): 3/23/2021             Click Here for Release of Records Request    Chart reviewed: immunizations are documented.   Immunization History   Administered Date(s) Administered    Influenza, FLUARIX, FLULAVAL, FLUZONE (age 6 mo+) and AFLURIA, (age 3 y+), Quadv PF, 0.5mL 10/09/2017    Influenza, Intradermal, Preservative free 11/13/2015    TDaP, ADACEL (age 10y-64y), BOOSTRIX (age 10y+), IM, 0.5mL 09/14/2011      
Patient provided with most updated VIS prior to administration. Opportunity given for questions and concerns provided. No concerns at this time    Immunizations administered to patient 6/11/2025 by Fawn Alexander LPN with consent.Patient tolerated procedure well. No reactions noted.  
Tenderness: There is no abdominal tenderness.   Musculoskeletal:      Right lower leg: No edema.      Left lower leg: No edema.   Lymphadenopathy:      Cervical: No cervical adenopathy.   Psychiatric:         Mood and Affect: Mood normal.            IAlireza, am scribing for and in the presence of Justyna Whiting MD. 6/11/25/9:24 AM EDT  Justyna CARRILLO MD, personally performed the services described by my scribe in my presence, and it is both accurate and complete.    An electronic signature was used to authenticate this note.    --Alireza Dykes

## 2025-06-12 ENCOUNTER — RESULTS FOLLOW-UP (OUTPATIENT)
Dept: PRIMARY CARE CLINIC | Facility: CLINIC | Age: 65
End: 2025-06-12

## 2025-06-12 DIAGNOSIS — E66.9 OBESITY (BMI 30-39.9): Primary | ICD-10-CM

## 2025-06-16 DIAGNOSIS — E78.2 MIXED HYPERLIPIDEMIA: Primary | ICD-10-CM

## 2025-06-16 RX ORDER — PRAVASTATIN SODIUM 10 MG
10 TABLET ORAL DAILY
Qty: 30 TABLET | Refills: 1 | Status: SHIPPED | OUTPATIENT
Start: 2025-06-16

## 2025-06-25 DIAGNOSIS — E66.9 OBESITY (BMI 30-39.9): ICD-10-CM

## 2025-06-26 DIAGNOSIS — E66.9 OBESITY (BMI 30-39.9): ICD-10-CM

## 2025-07-10 DIAGNOSIS — E78.2 MIXED HYPERLIPIDEMIA: ICD-10-CM

## 2025-07-10 RX ORDER — PRAVASTATIN SODIUM 10 MG
10 TABLET ORAL DAILY
Qty: 90 TABLET | Refills: 0 | Status: SHIPPED | OUTPATIENT
Start: 2025-07-10

## 2025-07-15 DIAGNOSIS — E66.9 OBESITY (BMI 30-39.9): ICD-10-CM

## 2025-07-15 RX ORDER — TIRZEPATIDE 7.5 MG/.5ML
INJECTION, SOLUTION SUBCUTANEOUS
Qty: 2 ML | Refills: 0 | Status: SHIPPED | OUTPATIENT
Start: 2025-07-15

## 2025-07-30 ENCOUNTER — OFFICE VISIT (OUTPATIENT)
Dept: PRIMARY CARE CLINIC | Facility: CLINIC | Age: 65
End: 2025-07-30
Payer: COMMERCIAL

## 2025-07-30 VITALS
RESPIRATION RATE: 18 BRPM | BODY MASS INDEX: 33.84 KG/M2 | OXYGEN SATURATION: 99 % | HEART RATE: 74 BPM | HEIGHT: 63 IN | WEIGHT: 191 LBS | TEMPERATURE: 97.3 F | DIASTOLIC BLOOD PRESSURE: 79 MMHG | SYSTOLIC BLOOD PRESSURE: 118 MMHG

## 2025-07-30 DIAGNOSIS — G43.009 MIGRAINE WITHOUT AURA AND WITHOUT STATUS MIGRAINOSUS, NOT INTRACTABLE: ICD-10-CM

## 2025-07-30 DIAGNOSIS — G47.33 OSA TREATED WITH BIPAP: ICD-10-CM

## 2025-07-30 DIAGNOSIS — E78.2 MIXED HYPERLIPIDEMIA: Primary | ICD-10-CM

## 2025-07-30 DIAGNOSIS — E66.9 OBESITY (BMI 30-39.9): ICD-10-CM

## 2025-07-30 PROCEDURE — 99214 OFFICE O/P EST MOD 30 MIN: CPT | Performed by: INTERNAL MEDICINE

## 2025-07-30 ASSESSMENT — ENCOUNTER SYMPTOMS
SORE THROAT: 0
DIARRHEA: 0
CONSTIPATION: 0
COUGH: 0
EYE DISCHARGE: 0
SHORTNESS OF BREATH: 0
RHINORRHEA: 0
ABDOMINAL PAIN: 0
BACK PAIN: 0
COLOR CHANGE: 0
CHEST TIGHTNESS: 0

## 2025-07-30 ASSESSMENT — PATIENT HEALTH QUESTIONNAIRE - PHQ9
SUM OF ALL RESPONSES TO PHQ QUESTIONS 1-9: 0
1. LITTLE INTEREST OR PLEASURE IN DOING THINGS: NOT AT ALL
SUM OF ALL RESPONSES TO PHQ QUESTIONS 1-9: 0
2. FEELING DOWN, DEPRESSED OR HOPELESS: NOT AT ALL

## 2025-07-30 NOTE — PROGRESS NOTES
Tricia Wells (:  1960) is a 64 y.o. female, Established patient, here for evaluation of the following chief complaint(s):  Weight Management      ASSESSMENT/PLAN:  1. Mixed hyperlipidemia  -     tirzepatide-weight management (ZEPBOUND) 10 MG/0.5ML SOAJ subCUTAneous auto-injector pen; Inject 10 mg into the skin every 7 days, Disp-6 mL, R-2Normal sent to pharmacy.  I increased Zepbound from 7.5 mg to 10 mg to continue injecting weekly.  Pt should continue taking pravastatin daily.   2. Obesity (BMI 30-39.9)  -     tirzepatide-weight management (ZEPBOUND) 10 MG/0.5ML SOAJ subCUTAneous auto-injector pen; Inject 10 mg into the skin every 7 days, Disp-6 mL, R-2Normal sent to pharmacy.  I increased Zepbound from 7.5 mg to 10 mg to continue injecting weekly.  BMI will improve with weight loss. Pt was advised to drink plain water with added lemon or cinnamon. I explained that 5,000 steps are needed daily for healthy lifestyle and to maintain conditioning, and she will need to increase to 10,000 a day to work on weight loss.   3. Migraine without aura and without status migrainosus, not intractable  Pt was advised to resume taking Topamax as prescribed for at least 2 weeks to determine its effectiveness.   4. PAT treated with BiPAP  -     tirzepatide-weight management (ZEPBOUND) 10 MG/0.5ML SOAJ subCUTAneous auto-injector pen; Inject 10 mg into the skin every 7 days, Disp-6 mL, R-2Normal sent to pharmacy.  I increased Zepbound from 7.5 mg to 10 mg to continue injecting weekly.  Pt should continue using her BiPAP at night. Previous sleep study reviewed.           Subjective   SUBJECTIVE/OBJECTIVE:  Weight Management  Pertinent negatives include no abdominal pain, arthralgias, congestion, coughing, fatigue, headaches, myalgias, rash or sore throat.       Patient presents today for weight management.    She is prescribed Zepbound 7.5 mg weekly, which she finds effective in curbing her cravings. She has lost 2 lbs

## 2025-07-30 NOTE — PROGRESS NOTES
Have you been to the ER, urgent care clinic since your last visit?  Hospitalized since your last visit?   NO      Have you seen or consulted any other health care providers outside our system since your last visit?   NO       “Have you had a pap smear?”    2024  Record has been requested via fax.      Chief Complaint   Patient presents with    Weight Management       Pt is ok with scribe.